# Patient Record
Sex: FEMALE | Race: WHITE | NOT HISPANIC OR LATINO | Employment: FULL TIME | URBAN - NONMETROPOLITAN AREA
[De-identification: names, ages, dates, MRNs, and addresses within clinical notes are randomized per-mention and may not be internally consistent; named-entity substitution may affect disease eponyms.]

---

## 2020-07-24 ENCOUNTER — OFFICE VISIT (OUTPATIENT)
Dept: PODIATRY | Facility: CLINIC | Age: 50
End: 2020-07-24

## 2020-07-24 VITALS — WEIGHT: 125 LBS | BODY MASS INDEX: 23 KG/M2 | HEIGHT: 62 IN | OXYGEN SATURATION: 98 % | HEART RATE: 83 BPM

## 2020-07-24 DIAGNOSIS — Q66.89 TARSAL COALITION OF LEFT FOOT: ICD-10-CM

## 2020-07-24 DIAGNOSIS — L84 FOOT CALLUS: ICD-10-CM

## 2020-07-24 DIAGNOSIS — M25.572 LEFT ANKLE PAIN, UNSPECIFIED CHRONICITY: ICD-10-CM

## 2020-07-24 DIAGNOSIS — M20.42 HAMMER TOE OF LEFT FOOT: Primary | ICD-10-CM

## 2020-07-24 DIAGNOSIS — M79.672 LEFT FOOT PAIN: ICD-10-CM

## 2020-07-24 PROCEDURE — 99203 OFFICE O/P NEW LOW 30 MIN: CPT | Performed by: PODIATRIST

## 2020-07-24 RX ORDER — ESTRADIOL 0.1 MG/G
CREAM VAGINAL
COMMUNITY
Start: 2019-11-07 | End: 2021-04-13

## 2020-07-24 RX ORDER — DEXTROAMPHETAMINE SACCHARATE, AMPHETAMINE ASPARTATE MONOHYDRATE, DEXTROAMPHETAMINE SULFATE AND AMPHETAMINE SULFATE 5; 5; 5; 5 MG/1; MG/1; MG/1; MG/1
20 CAPSULE, EXTENDED RELEASE ORAL
COMMUNITY
Start: 2020-07-28 | End: 2021-04-13

## 2020-07-24 NOTE — PROGRESS NOTES
Rita Cerda  1970  49 y.o. female     Patient presents to clinic today with complaint of a possible corn between her left fourth and fifth toes.    07/24/2020  Chief Complaint   Patient presents with   • Left Foot - Pain           History of Present Illness    Rita Cerda is a 49 y.o. female presents for evaluation of left fourth and fifth toe pain.  She states is a chronic, slowly worsening issue over the past year.  She states that her toes seem to rub together her shoe and she develops a painful skin lesion between her toes.  This is worse with prolonged weightbearing and close toed shoes and relatively relieved with rest.  She also feels that when standing in place she is unstable on her left foot and feels like her foot wants to roll inward whenever she shifts her body weight.  She denies any prior history of significant trauma or injury.  She denies any significant pain in her ankle.  She denies any other previous treatments for this issue.      Past Medical History:   Diagnosis Date   • Breast cancer (CMS/HCC)    • Callus          Past Surgical History:   Procedure Laterality Date   • BREAST BIOPSY     • BREAST LUMPECTOMY     • HYSTERECTOMY     • OOPHORECTOMY           Family History   Problem Relation Age of Onset   • Breast cancer Mother    • Cancer Mother    • Heart disease Mother    • Diabetes Mother    • Hypertension Mother    • Cancer Father    • Heart disease Father    • Hypertension Father          Social History     Socioeconomic History   • Marital status:      Spouse name: Not on file   • Number of children: Not on file   • Years of education: Not on file   • Highest education level: Not on file   Tobacco Use   • Smoking status: Never Smoker   • Smokeless tobacco: Never Used   Substance and Sexual Activity   • Alcohol use: Never     Frequency: Never   • Drug use: Never   • Sexual activity: Defer         Current Outpatient Medications   Medication Sig Dispense Refill   •  "estradiol (ESTRACE) 0.1 MG/GM vaginal cream PLACE 1 GRAM VAGINALLY DAILY AS NEEDED.     • estrogens, conjugated, (Premarin) 0.3 MG tablet TAKE 1 TABLET BY MOUTH EVERY DAY     • [START ON 7/28/2020] amphetamine-dextroamphetamine XR (ADDERALL XR) 20 MG 24 hr capsule Take 20 mg by mouth       No current facility-administered medications for this visit.          OBJECTIVE    Pulse 83   Ht 157.5 cm (62\")   Wt 56.7 kg (125 lb)   SpO2 98%   BMI 22.86 kg/m²       Review of Systems   Constitutional: Negative.    HENT: Negative.    Eyes: Negative.    Respiratory: Negative.    Cardiovascular: Negative.    Gastrointestinal: Negative.    Endocrine: Negative.         Graves   Genitourinary: Negative.    Musculoskeletal:        Foot pain   Skin: Negative.    Allergic/Immunologic: Negative.    Neurological: Negative.    Hematological: Negative.    Psychiatric/Behavioral:        ADHD         Physical Exam   Constitutional: she appears well-developed and well-nourished.   HEENT: Normocephalic. Atraumatic  CV: No CP. RRR  Resp: Non-labored respirations  Psychiatric: she has a normal mood and affect. her behavior is normal.         Lower Extremity Exam:  Vascular: DP/PT pulses palpable 2+.   No edema  Foot warm  CFT wnl  Neuro: Protective sensation intact, b/l.  DTRs intact  Integument: No open wounds   Interdigital corn formation to lateral left fourth digit.  Positive tenderness palpation.  No erythema, scaling  Skin quality normal  Musculoskeletal: LE muscle strength 5/5.   Gait normal  Ankle ROM full without pain or crepitus  STJ ROM limited on left without pain or crepitus  Flexible hammertoe deformity 2 through 5 with adductovarus rotation of digits 4 and 5 on left              ASSESSMENT AND PLAN    Rita was seen today for pain.    Diagnoses and all orders for this visit:    Hammer toe of left foot    Left foot pain  -     XR Foot 3+ View Left    Left ankle pain, unspecified chronicity  -     XR Ankle 3+ View Left    Tarsal " coalition of left foot  -     CT FOOT LEFT WITHOUT CONTRAST; Future    Foot callus      -Comprehensive foot and ankle exam  -Radiographs ordered and reviewed  -Discussed findings of flexible adductovarus hammertoe deformity as well as suspected middle facet tarsal coalition.  -Recommend CT scan to confirm diagnosis of tarsal coalition.  -Refer to physical therapy for custom orthotics with lateral heel wedge to increase stability  -Dispensed toe sleeves for padding of fourth and fifth digits.  Briefly discussed possible surgical correction in future as necessary.  It advised soft mesh wide toe shoe box.  -Recheck 4 weeks, as needed          This document has been electronically signed by Bakari Wilkerson DPM on July 27, 2020 15:39     EMR Dragon/Transcription disclaimer:   Much of this encounter note is an electronic transcription/translation of spoken language to printed text. The electronic translation of spoken language may permit erroneous, or at times, nonsensical words or phrases to be inadvertently transcribed; Although I have reviewed the note for such errors, some may still exist.    Bakari Wilkerson DPM  7/27/2020  15:39

## 2020-07-27 ENCOUNTER — TRANSCRIBE ORDERS (OUTPATIENT)
Dept: PODIATRY | Facility: CLINIC | Age: 50
End: 2020-07-27

## 2020-07-27 DIAGNOSIS — Q66.89 TARSAL COALITION: Primary | ICD-10-CM

## 2020-07-30 ENCOUNTER — HOSPITAL ENCOUNTER (OUTPATIENT)
Dept: CT IMAGING | Facility: HOSPITAL | Age: 50
Discharge: HOME | End: 2020-07-30
Admitting: PODIATRIST

## 2020-07-30 DIAGNOSIS — Q66.89 TARSAL COALITION OF LEFT FOOT: ICD-10-CM

## 2020-07-30 PROCEDURE — 73700 CT LOWER EXTREMITY W/O DYE: CPT

## 2020-08-06 ENCOUNTER — HOSPITAL ENCOUNTER (OUTPATIENT)
Dept: PHYSICAL THERAPY | Facility: HOSPITAL | Age: 50
Setting detail: THERAPIES SERIES
Discharge: HOME | End: 2020-08-06

## 2020-08-06 DIAGNOSIS — Q66.89 TARSAL COALITION: Primary | ICD-10-CM

## 2020-08-06 PROCEDURE — 97161 PT EVAL LOW COMPLEX 20 MIN: CPT | Performed by: PHYSICAL THERAPIST

## 2020-08-06 NOTE — THERAPY EVALUATION
Outpatient Physical Therapy Ortho Initial Evaluation  [facility]     Patient Name: Rita Cerda  : 1970  MRN: [id number]  Today's Date: 2020      Visit Date: 2020    There is no problem list on file for this patient.       Past Medical History:   Diagnosis Date   • Breast cancer (CMS/HCC)    • Callus         Past Surgical History:   Procedure Laterality Date   • BREAST BIOPSY     • BREAST LUMPECTOMY     • HYSTERECTOMY     • OOPHORECTOMY         Visit Dx:     ICD-10-CM ICD-9-CM   1. Tarsal coalition Q66.89 755.67             PT Ortho     Row Name 20 0850       Subjective Comments    Subjective Comments  Present today with no history of custom orthotics. Notes left foot pain more than right. Notes pain of lateral left foot and 4th and 5th ray.   -BB       Precautions and Contraindications    Precautions/Limitations  no known precautions/limitations  -BB       Subjective Pain    Able to rate subjective pain?  yes  -BB    Subjective Pain Comment  specific pain level not noted  -BB       Posture/Observations    Posture/Observations Comments  callusing between 4th and 5th toe on left. Bilateral supination L>R with pes cavus feet.   -BB       Special Tests/Palpation    Special Tests/Palpation  -- tight PF noted   -BB       General ROM    GENERAL ROM COMMENTS  WNL for gait   -BB       MMT (Manual Muscle Testing)    General MMT Comments  WNL for gait   -BB       Sensation    Sensation WNL?  WNL  -BB      User Key  (r) = Recorded By, (t) = Taken By, (c) = Cosigned By    Initials Name Provider Type    BB Lilly Calvo, PT DPT Physical Therapist                      Therapy Education  Education Details: skin inspection and wear schedule   Given: HEP  Program: New  How Provided: Verbal  Provided to: Patient  Level of Understanding: Verbalized     PT OP Goals     Row Name 20 0850          PT Short Term Goals    STG Date to Achieve  20  -BB     STG 1  Independent in orthotic wear  schedule and skin inspection   -BB        Time Calculation    PT Goal Re-Cert Due Date  08/27/20  -BB       User Key  (r) = Recorded By, (t) = Taken By, (c) = Cosigned By    Initials Name Provider Type    Lilly Sagastume PT DPT Physical Therapist          PT Assessment/Plan     Row Name 08/06/20 0850          PT Assessment    Functional Limitations  Impaired gait;Limitations in functional capacity and performance  -BB     Impairments  Balance;Gait;Pain;Poor body mechanics  -BB     Rehab Potential  Good  -BB     Patient/caregiver participated in establishment of treatment plan and goals  Yes  -BB     Patient would benefit from skilled therapy intervention  Yes  -BB        PT Plan    Predicted Duration of Therapy Intervention (Therapy Eval)  PRN  -BB     Planned CPT's?  PT EVAL LOW COMPLEXITY: 17492;PT ORTHOTIC MGMT/TRAIN EA 15 MIN: 31024;PT THER SUPP EA 15 MIN  -BB     PT Plan Comments  orthotic checkout and skin inspection   -BB       User Key  (r) = Recorded By, (t) = Taken By, (c) = Cosigned By    Initials Name Provider Type    Lilly Sagastume PT DPT Physical Therapist            OP Exercises     Row Name 08/06/20 0850             Subjective Comments    Subjective Comments  Present today with no history of custom orthotics. Notes left foot pain more than right. Notes pain of lateral left foot and 4th and 5th ray.   -BB         Subjective Pain    Able to rate subjective pain?  yes  -BB      Subjective Pain Comment  specific pain level not noted  -BB        User Key  (r) = Recorded By, (t) = Taken By, (c) = Cosigned By    Initials Name Provider Type    Lilly Sagastume PT DPT Physical Therapist                                  Time Calculation:     Start Time: 0850  Stop Time: 0910  Time Calculation (min): 20 min     Therapy Charges for Today     Code Description Service Date Service Provider Modifiers Qty    57809832685  PT EVAL LOW COMPLEXITY 1 8/6/2020 Lilly Calvo PT DPT GP 1    [id number]   PT-CUSTOM ORTHOTICS-LEVEL 2 8/6/2020 Lilly Calvo, PT DPT  1                    Lilly Calvo, PT DPT  8/6/2020

## 2020-08-13 ENCOUNTER — OFFICE VISIT (OUTPATIENT)
Dept: PODIATRY | Facility: CLINIC | Age: 50
End: 2020-08-13

## 2020-08-13 VITALS — WEIGHT: 125 LBS | OXYGEN SATURATION: 99 % | HEART RATE: 81 BPM | HEIGHT: 62 IN | BODY MASS INDEX: 23 KG/M2

## 2020-08-13 DIAGNOSIS — L84 FOOT CALLUS: ICD-10-CM

## 2020-08-13 DIAGNOSIS — Q66.72 PES CAVUS OF LEFT FOOT: ICD-10-CM

## 2020-08-13 DIAGNOSIS — M20.42 HAMMER TOE OF LEFT FOOT: Primary | ICD-10-CM

## 2020-08-13 PROCEDURE — 99213 OFFICE O/P EST LOW 20 MIN: CPT | Performed by: PODIATRIST

## 2020-08-13 NOTE — PROGRESS NOTES
Rita Cerda  1970  49 y.o. female     Patient presents today for CT results.     08/13/2020    Chief Complaint   Patient presents with   • Left Foot - CT results, Follow-up           History of Present Illness    Rita Cerda is a 49 y.o. female presents for f/u evaluation of left fourth and fifth toe pain, ankle instability and concern for possible underlying tarsal coalition.  She was sent for CT scan since last visit to rule out coalition.  She continues to have some pain due to interdigital corn formation.  States that toe sleeves and spacers help somewhat.  She has also been fitted for her custom orthotics but they have not yet been fabricated.    Past Medical History:   Diagnosis Date   • Breast cancer (CMS/HCC)    • Callus          Past Surgical History:   Procedure Laterality Date   • BREAST BIOPSY     • BREAST LUMPECTOMY     • HYSTERECTOMY     • OOPHORECTOMY           Family History   Problem Relation Age of Onset   • Breast cancer Mother    • Cancer Mother    • Heart disease Mother    • Diabetes Mother    • Hypertension Mother    • Cancer Father    • Heart disease Father    • Hypertension Father          Social History     Socioeconomic History   • Marital status:      Spouse name: Not on file   • Number of children: Not on file   • Years of education: Not on file   • Highest education level: Not on file   Tobacco Use   • Smoking status: Never Smoker   • Smokeless tobacco: Never Used   Substance and Sexual Activity   • Alcohol use: Never     Frequency: Never   • Drug use: Never   • Sexual activity: Defer         Current Outpatient Medications   Medication Sig Dispense Refill   • amphetamine-dextroamphetamine XR (ADDERALL XR) 20 MG 24 hr capsule Take 20 mg by mouth     • estradiol (ESTRACE) 0.1 MG/GM vaginal cream PLACE 1 GRAM VAGINALLY DAILY AS NEEDED.     • estrogens, conjugated, (Premarin) 0.3 MG tablet TAKE 1 TABLET BY MOUTH EVERY DAY       No current facility-administered  "medications for this visit.          OBJECTIVE    Pulse 81   Ht 157.5 cm (62\")   Wt 56.7 kg (125 lb)   SpO2 99%   BMI 22.86 kg/m²       Review of Systems   Constitutional: Negative.    HENT: Negative.    Eyes: Negative.    Respiratory: Negative.    Cardiovascular: Negative.    Gastrointestinal: Negative.    Endocrine: Negative.         Graves   Genitourinary: Negative.    Musculoskeletal:        Foot pain   Skin: Negative.    Allergic/Immunologic: Negative.    Neurological: Negative.    Hematological: Negative.    Psychiatric/Behavioral:        ADHD         Physical Exam   Constitutional: she appears well-developed and well-nourished.   HEENT: Normocephalic. Atraumatic  CV: No CP. RRR  Resp: Non-labored respirations  Psychiatric: she has a normal mood and affect. her behavior is normal.         Lower Extremity Exam:  Vascular: DP/PT pulses palpable 2+.   No edema  Foot warm  CFT wnl  Neuro: Protective sensation intact, b/l.  DTRs intact  Integument: No open wounds   Interdigital corn formation to lateral left fourth digit.  Positive tenderness palpation.  No erythema, scaling  Skin quality normal  Musculoskeletal: LE muscle strength 5/5.   Gait normal  Ankle ROM full without pain or crepitus  STJ ROM limited on left without pain or crepitus  Flexible hammertoe deformity 2 through 5 with adductovarus rotation of digits 4 and 5 on left      CT left foot.         CLINICAL INDICATION: Left foot pain. Evaluate for tarsal  coalition.        COMPARISON: Left foot July 24, 2020.        TECHNIQUE: Noncontrast study. Helical scanning with axial and  coronal reformations. Soft tissue, lung, and bone windows  reviewed.     This exam was performed according to our departmental  dose-optimization program, which includes automated exposure  control, adjustment of the mA and/or kV according to patient size  and/or use of iterative reconstruction technique.      CT FINDINGS: There are no bony abnormalities. No evidence of " a  tarsal coalition.     No soft tissue abnormalities are appreciated.     IMPRESSION:  Unremarkable CT left foot. No evidence for tarsal  coalition.     Electronically signed by:  Alessandro Hdez MD  7/31/2020 1:38 PM CDT  Workstation: [id number]        ASSESSMENT AND PLAN    Rita was seen today for ct results and follow-up.    Diagnoses and all orders for this visit:    Hammer toe of left foot    Foot callus    Pes cavus of left foot      -Comprehensive foot and ankle exam  -CT scan reviewed with patient.  No evidence of tarsal coalition noted, however patient does have a hindfoot cavus with limited subtalar joint eversion.  -We did discuss again findings of interdigital corn formation due to hammertoe and tailor's bunion.  Discussed continued appropriate shoe gear and digital spacer use.  Briefly discussed possible surgical correction of both hammertoe deformity and hindfoot cavus.  Will await custom orthotic fabrication and trial these for about a month before making any further decisions.  -Recheck 4 weeks, as needed          This document has been electronically signed by Bakari Wilkerson DPM on August 15, 2020 13:47     EMR Dragon/Transcription disclaimer:   Much of this encounter note is an electronic transcription/translation of spoken language to printed text. The electronic translation of spoken language may permit erroneous, or at times, nonsensical words or phrases to be inadvertently transcribed; Although I have reviewed the note for such errors, some may still exist.    Bakari Wilkerson DPM  8/15/2020  13:47

## 2021-01-11 ENCOUNTER — OFFICE VISIT (OUTPATIENT)
Dept: PODIATRY | Facility: CLINIC | Age: 51
End: 2021-01-11

## 2021-01-11 VITALS — OXYGEN SATURATION: 98 % | BODY MASS INDEX: 23 KG/M2 | WEIGHT: 125 LBS | HEART RATE: 78 BPM | HEIGHT: 62 IN

## 2021-01-11 DIAGNOSIS — M25.372 ANKLE INSTABILITY, LEFT: ICD-10-CM

## 2021-01-11 DIAGNOSIS — L84 FOOT CALLUS: ICD-10-CM

## 2021-01-11 DIAGNOSIS — M20.42 HAMMER TOE OF LEFT FOOT: ICD-10-CM

## 2021-01-11 DIAGNOSIS — Q66.72 PES CAVUS OF LEFT FOOT: Primary | ICD-10-CM

## 2021-01-11 PROCEDURE — 99213 OFFICE O/P EST LOW 20 MIN: CPT | Performed by: PODIATRIST

## 2021-01-11 NOTE — PROGRESS NOTES
Rita Cerda  1970  50 y.o. female     Patient presents today for a follow up on the left foot.     01/11/2021      Chief Complaint   Patient presents with   • Left Foot - Follow-up           History of Present Illness    Rita Cerda is a 50 y.o. female presents for f/u evaluation of left fourth and fifth toe pain, ankle instability.  Previously treated with custom orthotics which she states initially seemed to help somewhat with her instability however she feels this has been less helpful lately.  She continues to have recurrent pain to her left fourth and fifth toes as well due to an interdigital callus formation which she has been trimming with some improvement.  She states overall her symptoms are improved compared to 6 months ago however still present.    Past Medical History:   Diagnosis Date   • Breast cancer (CMS/HCC)    • Callus          Past Surgical History:   Procedure Laterality Date   • BREAST BIOPSY     • BREAST LUMPECTOMY     • HYSTERECTOMY     • OOPHORECTOMY           Family History   Problem Relation Age of Onset   • Breast cancer Mother    • Cancer Mother    • Heart disease Mother    • Diabetes Mother    • Hypertension Mother    • Cancer Father    • Heart disease Father    • Hypertension Father          Social History     Socioeconomic History   • Marital status:      Spouse name: Not on file   • Number of children: Not on file   • Years of education: Not on file   • Highest education level: Not on file   Tobacco Use   • Smoking status: Never Smoker   • Smokeless tobacco: Never Used   Substance and Sexual Activity   • Alcohol use: Never     Frequency: Never   • Drug use: Never   • Sexual activity: Defer         Current Outpatient Medications   Medication Sig Dispense Refill   • estradiol (ESTRACE) 0.1 MG/GM vaginal cream PLACE 1 GRAM VAGINALLY DAILY AS NEEDED.     • estrogens, conjugated, (Premarin) 0.3 MG tablet TAKE 1 TABLET BY MOUTH EVERY DAY     •  "amphetamine-dextroamphetamine XR (ADDERALL XR) 20 MG 24 hr capsule Take 20 mg by mouth       No current facility-administered medications for this visit.          OBJECTIVE    Pulse 78   Ht 157.5 cm (62\")   Wt 56.7 kg (125 lb)   SpO2 98%   BMI 22.86 kg/m²       Review of Systems   Constitutional: Negative.    HENT: Negative.    Eyes: Negative.    Respiratory: Negative.    Cardiovascular: Negative.    Gastrointestinal: Negative.    Endocrine: Negative.         Graves   Genitourinary: Negative.    Musculoskeletal:        Foot pain   Skin: Negative.    Allergic/Immunologic: Negative.    Neurological: Negative.    Hematological: Negative.    Psychiatric/Behavioral:        ADHD         Physical Exam   Constitutional: she appears well-developed and well-nourished.   HEENT: Normocephalic. Atraumatic  CV: No CP. RRR  Resp: Non-labored respirations  Psychiatric: she has a normal mood and affect. her behavior is normal.         Lower Extremity Exam:  Vascular: DP/PT pulses palpable 2+.   No edema  Foot warm  CFT wnl  Neuro: Protective sensation intact, b/l.  DTRs intact  Integument: No open wounds   Interdigital corn formation to lateral left fourth digit.  Positive tenderness palpation.  No erythema, scaling  Skin quality normal  Musculoskeletal: LE muscle strength 5/5.   Gait normal  Ankle ROM full without pain or crepitus  STJ ROM limited on left without pain or crepitus  Flexible hammertoe deformity 2 through 5 with adductovarus rotation of digits 4 and 5 on left      CT left foot.         CLINICAL INDICATION: Left foot pain. Evaluate for tarsal  coalition.        COMPARISON: Left foot July 24, 2020.        TECHNIQUE: Noncontrast study. Helical scanning with axial and  coronal reformations. Soft tissue, lung, and bone windows  reviewed.     This exam was performed according to our departmental  dose-optimization program, which includes automated exposure  control, adjustment of the mA and/or kV according to patient " size  and/or use of iterative reconstruction technique.      CT FINDINGS: There are no bony abnormalities. No evidence of a  tarsal coalition.     No soft tissue abnormalities are appreciated.     IMPRESSION:  Unremarkable CT left foot. No evidence for tarsal  coalition.     Electronically signed by:  Alessandro Hdez MD  7/31/2020 1:38 PM CDT  Workstation: [id number]        ASSESSMENT AND PLAN    Diagnoses and all orders for this visit:    1. Pes cavus of left foot (Primary)    2. Ankle instability, left    3. Hammer toe of left foot    4. Foot callus      -Comprehensive foot and ankle exam  -Extensive discussion regarding hammertoe deformity as well as mild ankle instability resultant from pes cavus.  Her deformities however are relatively mild.  At this time I did not recommend surgical intervention as they do not seem to be limiting her daily function.  Did perform a modification of her custom orthotic with addition of a heel lift and medial heel skive.    - continue toe spacers and padding, callus management  -Recheck 1 month, as needed          This document has been electronically signed by Bakari Wilkerson DPM on January 12, 2021 21:20 CST     EMR Dragon/Transcription disclaimer:   Much of this encounter note is an electronic transcription/translation of spoken language to printed text. The electronic translation of spoken language may permit erroneous, or at times, nonsensical words or phrases to be inadvertently transcribed; Although I have reviewed the note for such errors, some may still exist.    Bakari Wilkerson DPM  1/12/2021  21:20 CST

## 2021-03-25 ENCOUNTER — OFFICE VISIT (OUTPATIENT)
Dept: PODIATRY | Facility: CLINIC | Age: 51
End: 2021-03-25

## 2021-03-25 VITALS — HEART RATE: 91 BPM | BODY MASS INDEX: 23 KG/M2 | WEIGHT: 125 LBS | OXYGEN SATURATION: 98 % | HEIGHT: 62 IN

## 2021-03-25 DIAGNOSIS — L84 FOOT CALLUS: ICD-10-CM

## 2021-03-25 DIAGNOSIS — M21.622 TAILOR'S BUNION OF LEFT FOOT: ICD-10-CM

## 2021-03-25 DIAGNOSIS — M20.42 HAMMER TOE OF LEFT FOOT: Primary | ICD-10-CM

## 2021-03-25 PROCEDURE — 99214 OFFICE O/P EST MOD 30 MIN: CPT | Performed by: PODIATRIST

## 2021-03-25 NOTE — PROGRESS NOTES
Rita Cerda  1970  50 y.o. female     Patient presents today for a follow up on the left foot.     03/25/2021        Chief Complaint   Patient presents with   • Left Foot - Follow-up           History of Present Illness    Rita Cerda is a 50 y.o. female presents for f/u evaluation of left fourth and fifth toe pain, ankle instability.  Previously treated with custom orthotics which she states initially seemed to help somewhat with her instability.  She continues to have recurrent pain to her left fourth and fifth toes as well due to an interdigital callus formation which she has been trimming with some improvement.  She states overall her symptoms are improved compared to 6 months ago however still present.  She wishes to discuss more long-term options.    Past Medical History:   Diagnosis Date   • Breast cancer (CMS/HCC)    • Callus          Past Surgical History:   Procedure Laterality Date   • BREAST BIOPSY     • BREAST LUMPECTOMY     • HYSTERECTOMY     • OOPHORECTOMY           Family History   Problem Relation Age of Onset   • Breast cancer Mother    • Cancer Mother    • Heart disease Mother    • Diabetes Mother    • Hypertension Mother    • Cancer Father    • Heart disease Father    • Hypertension Father          Social History     Socioeconomic History   • Marital status:      Spouse name: Not on file   • Number of children: Not on file   • Years of education: Not on file   • Highest education level: Not on file   Tobacco Use   • Smoking status: Never Smoker   • Smokeless tobacco: Never Used   Substance and Sexual Activity   • Alcohol use: Never   • Drug use: Never   • Sexual activity: Defer         Current Outpatient Medications   Medication Sig Dispense Refill   • amphetamine-dextroamphetamine XR (ADDERALL XR) 20 MG 24 hr capsule Take 20 mg by mouth     • estradiol (ESTRACE) 0.1 MG/GM vaginal cream PLACE 1 GRAM VAGINALLY DAILY AS NEEDED.     • estrogens, conjugated, (Premarin) 0.3 MG  "tablet TAKE 1 TABLET BY MOUTH EVERY DAY       No current facility-administered medications for this visit.         OBJECTIVE    Pulse 91   Ht 157.5 cm (62\")   Wt 56.7 kg (125 lb)   SpO2 98%   BMI 22.86 kg/m²       Review of Systems   Constitutional: Negative.    HENT: Negative.    Eyes: Negative.    Respiratory: Negative.    Cardiovascular: Negative.    Gastrointestinal: Negative.    Endocrine: Negative.         Graves   Genitourinary: Negative.    Musculoskeletal:        Foot pain   Skin: Negative.    Allergic/Immunologic: Negative.    Neurological: Negative.    Hematological: Negative.    Psychiatric/Behavioral:        ADHD         Physical Exam   Constitutional: she appears well-developed and well-nourished.   HEENT: Normocephalic. Atraumatic  CV: No CP. RRR  Resp: Non-labored respirations  Psychiatric: she has a normal mood and affect. her behavior is normal.         Lower Extremity Exam:  Vascular: DP/PT pulses palpable 2+.   No edema  Foot warm  CFT wnl  Neuro: Protective sensation intact, b/l.  DTRs intact  Integument: No open wounds   Interdigital corn formation to lateral left fourth digit.  Positive tenderness palpation.  No erythema, scaling  Skin quality normal  Musculoskeletal: LE muscle strength 5/5.   Gait normal  Ankle ROM full without pain or crepitus  STJ ROM limited on left without pain or crepitus  Flexible hammertoe deformity 2 through 5 with adductovarus rotation of digits 4 and 5 on left  Mild tailor's bunion on left          ASSESSMENT AND PLAN    Diagnoses and all orders for this visit:    1. Hammer toe of left foot (Primary)  -     Case Request  -     ceFAZolin (ANCEF) 2 g in sodium chloride 0.9 % 100 mL IVPB    2. Foot callus  -     Case Request  -     ceFAZolin (ANCEF) 2 g in sodium chloride 0.9 % 100 mL IVPB    3. Tailor's bunion of left foot  -     Case Request  -     ceFAZolin (ANCEF) 2 g in sodium chloride 0.9 % 100 mL IVPB    Other orders  -     Follow Anesthesia Guidelines / " Protocol; Future  -     Obtain Informed Consent; Future  -     Follow Anesthesia Guidelines / Protocol; Standing  -     Provide Instructions to Patient Regarding NPO Status; Future  -     Verify NPO Status; Standing  -     Obtain Informed Consent (If Not Done Inpatient or PAT); Standing      -Comprehensive foot and ankle exam  -Extensive discussion regarding hammertoe deformity as well as mild ankle instability resultant from pes cavus.    -Orthotics have helped with her instability however she continues to have significant forefoot pain related to painful interdigital skin lesion, hammertoe and tailor's bunion deformities.  She is interested in surgical correction.  We discussed all risk, benefits and potential complications related to fourth and fifth hammertoe correction, tailor's bunionectomy and excision of skin lesion.  -We will plan for 4/16/2020  -Recheck 1 month          This document has been electronically signed by Bakari Wilkerson DPM on March 29, 2021 07:31 CDT     EMR Dragon/Transcription disclaimer:   Much of this encounter note is an electronic transcription/translation of spoken language to printed text. The electronic translation of spoken language may permit erroneous, or at times, nonsensical words or phrases to be inadvertently transcribed; Although I have reviewed the note for such errors, some may still exist.    Bakari Wilkerson DPM  3/29/2021  07:31 CDT

## 2021-03-29 PROBLEM — M20.42 HAMMER TOE OF LEFT FOOT: Status: ACTIVE | Noted: 2021-03-29

## 2021-03-29 PROBLEM — M21.622 TAILOR'S BUNION OF LEFT FOOT: Status: ACTIVE | Noted: 2021-03-29

## 2021-03-29 PROBLEM — L84 FOOT CALLUS: Status: ACTIVE | Noted: 2021-03-29

## 2021-03-29 RX ORDER — BUPIVACAINE HCL/0.9 % NACL/PF 0.1 %
2 PLASTIC BAG, INJECTION (ML) EPIDURAL ONCE
Status: CANCELLED | OUTPATIENT
Start: 2021-04-16 | End: 2021-03-29

## 2021-04-07 ENCOUNTER — TELEPHONE (OUTPATIENT)
Dept: PODIATRY | Facility: CLINIC | Age: 51
End: 2021-04-07

## 2021-04-07 NOTE — TELEPHONE ENCOUNTER
Spoke with patient and let her know that I have received the [facility] paperwork but Bryan has not sent me anything.

## 2021-04-07 NOTE — TELEPHONE ENCOUNTER
PT called stating a [place] paper was faxed over for her and she wanted to make sure it was received and from [facility] term [facility].

## 2021-04-13 ENCOUNTER — LAB (OUTPATIENT)
Dept: LAB | Facility: HOSPITAL | Age: 51
End: 2021-04-13

## 2021-04-13 ENCOUNTER — PRE-ADMISSION TESTING (OUTPATIENT)
Dept: PREADMISSION TESTING | Facility: HOSPITAL | Age: 51
End: 2021-04-13

## 2021-04-13 VITALS
DIASTOLIC BLOOD PRESSURE: 80 MMHG | WEIGHT: 133 LBS | SYSTOLIC BLOOD PRESSURE: 126 MMHG | HEART RATE: 66 BPM | RESPIRATION RATE: 18 BRPM | OXYGEN SATURATION: 97 % | HEIGHT: 62 IN | BODY MASS INDEX: 24.48 KG/M2

## 2021-04-13 DIAGNOSIS — Z01.818 PREOP TESTING: Primary | ICD-10-CM

## 2021-04-13 LAB — SARS-COV-2 N GENE RESP QL NAA+PROBE: NOT DETECTED

## 2021-04-13 PROCEDURE — 87635 SARS-COV-2 COVID-19 AMP PRB: CPT

## 2021-04-13 PROCEDURE — C9803 HOPD COVID-19 SPEC COLLECT: HCPCS

## 2021-04-13 RX ORDER — SODIUM CHLORIDE, SODIUM GLUCONATE, SODIUM ACETATE, POTASSIUM CHLORIDE AND MAGNESIUM CHLORIDE 526; 502; 368; 37; 30 MG/100ML; MG/100ML; MG/100ML; MG/100ML; MG/100ML
1000 INJECTION, SOLUTION INTRAVENOUS CONTINUOUS PRN
Status: CANCELLED | OUTPATIENT
Start: 2021-04-16

## 2021-04-16 ENCOUNTER — ANESTHESIA EVENT (OUTPATIENT)
Dept: PERIOP | Facility: HOSPITAL | Age: 51
End: 2021-04-16

## 2021-04-16 ENCOUNTER — HOSPITAL ENCOUNTER (OUTPATIENT)
Facility: HOSPITAL | Age: 51
Setting detail: HOSPITAL OUTPATIENT SURGERY
Discharge: HOME | End: 2021-04-16
Attending: PODIATRIST | Admitting: PODIATRIST

## 2021-04-16 ENCOUNTER — APPOINTMENT (OUTPATIENT)
Dept: GENERAL RADIOLOGY | Facility: HOSPITAL | Age: 51
End: 2021-04-16

## 2021-04-16 ENCOUNTER — ANESTHESIA (OUTPATIENT)
Dept: PERIOP | Facility: HOSPITAL | Age: 51
End: 2021-04-16

## 2021-04-16 VITALS
HEART RATE: 78 BPM | SYSTOLIC BLOOD PRESSURE: 139 MMHG | RESPIRATION RATE: 18 BRPM | BODY MASS INDEX: 24.1 KG/M2 | WEIGHT: 130.95 LBS | HEIGHT: 62 IN | TEMPERATURE: 97 F | OXYGEN SATURATION: 100 % | DIASTOLIC BLOOD PRESSURE: 87 MMHG

## 2021-04-16 DIAGNOSIS — M20.42 HAMMER TOE OF LEFT FOOT: ICD-10-CM

## 2021-04-16 DIAGNOSIS — L84 FOOT CALLUS: ICD-10-CM

## 2021-04-16 DIAGNOSIS — M21.622 TAILOR'S BUNION OF LEFT FOOT: ICD-10-CM

## 2021-04-16 PROCEDURE — 25010000002 MIDAZOLAM PER 1 MG: Performed by: NURSE ANESTHETIST, CERTIFIED REGISTERED

## 2021-04-16 PROCEDURE — 25010000002 FENTANYL CITRATE (PF) 100 MCG/2ML SOLUTION: Performed by: NURSE ANESTHETIST, CERTIFIED REGISTERED

## 2021-04-16 PROCEDURE — 76000 FLUOROSCOPY <1 HR PHYS/QHP: CPT

## 2021-04-16 PROCEDURE — 28308 INCISION OF METATARSAL: CPT | Performed by: PODIATRIST

## 2021-04-16 PROCEDURE — 25010000002 DEXAMETHASONE PER 1 MG: Performed by: NURSE ANESTHETIST, CERTIFIED REGISTERED

## 2021-04-16 PROCEDURE — 28230 INCISION OF FOOT TENDON(S): CPT | Performed by: PODIATRIST

## 2021-04-16 PROCEDURE — C1713 ANCHOR/SCREW BN/BN,TIS/BN: HCPCS | Performed by: PODIATRIST

## 2021-04-16 PROCEDURE — 25010000002 PROPOFOL 10 MG/ML EMULSION: Performed by: NURSE ANESTHETIST, CERTIFIED REGISTERED

## 2021-04-16 PROCEDURE — 11400 EXC TR-EXT B9+MARG 0.5 CM<: CPT | Performed by: PODIATRIST

## 2021-04-16 PROCEDURE — 25010000002 ONDANSETRON PER 1 MG: Performed by: NURSE ANESTHETIST, CERTIFIED REGISTERED

## 2021-04-16 PROCEDURE — 25010000002 CEFAZOLIN PER 500 MG: Performed by: PODIATRIST

## 2021-04-16 PROCEDURE — 76942 ECHO GUIDE FOR BIOPSY: CPT | Performed by: PODIATRIST

## 2021-04-16 PROCEDURE — 25010000002 ROPIVACAINE PER 1 MG: Performed by: ANESTHESIOLOGY

## 2021-04-16 PROCEDURE — 28285 REPAIR OF HAMMERTOE: CPT | Performed by: PODIATRIST

## 2021-04-16 DEVICE — CANNULATED SCREW
Type: IMPLANTABLE DEVICE | Site: TOE FIFTH | Status: FUNCTIONAL
Brand: ASNIS

## 2021-04-16 RX ORDER — SODIUM CHLORIDE, SODIUM GLUCONATE, SODIUM ACETATE, POTASSIUM CHLORIDE AND MAGNESIUM CHLORIDE 526; 502; 368; 37; 30 MG/100ML; MG/100ML; MG/100ML; MG/100ML; MG/100ML
1000 INJECTION, SOLUTION INTRAVENOUS CONTINUOUS PRN
Status: DISCONTINUED | OUTPATIENT
Start: 2021-04-16 | End: 2021-04-16 | Stop reason: HOSPADM

## 2021-04-16 RX ORDER — ONDANSETRON 2 MG/ML
INJECTION INTRAMUSCULAR; INTRAVENOUS AS NEEDED
Status: DISCONTINUED | OUTPATIENT
Start: 2021-04-16 | End: 2021-04-16 | Stop reason: SURG

## 2021-04-16 RX ORDER — BUPIVACAINE HCL/0.9 % NACL/PF 0.1 %
2 PLASTIC BAG, INJECTION (ML) EPIDURAL ONCE
Status: COMPLETED | OUTPATIENT
Start: 2021-04-16 | End: 2021-04-16

## 2021-04-16 RX ORDER — ONDANSETRON 2 MG/ML
4 INJECTION INTRAMUSCULAR; INTRAVENOUS ONCE AS NEEDED
Status: DISCONTINUED | OUTPATIENT
Start: 2021-04-16 | End: 2021-04-16 | Stop reason: HOSPADM

## 2021-04-16 RX ORDER — LIDOCAINE HYDROCHLORIDE 20 MG/ML
INJECTION, SOLUTION INFILTRATION; PERINEURAL AS NEEDED
Status: DISCONTINUED | OUTPATIENT
Start: 2021-04-16 | End: 2021-04-16 | Stop reason: SURG

## 2021-04-16 RX ORDER — PROPOFOL 10 MG/ML
VIAL (ML) INTRAVENOUS AS NEEDED
Status: DISCONTINUED | OUTPATIENT
Start: 2021-04-16 | End: 2021-04-16 | Stop reason: SURG

## 2021-04-16 RX ORDER — ACETAMINOPHEN 325 MG/1
650 TABLET ORAL ONCE
Status: COMPLETED | OUTPATIENT
Start: 2021-04-16 | End: 2021-04-16

## 2021-04-16 RX ORDER — HYDROCODONE BITARTRATE AND ACETAMINOPHEN 7.5; 325 MG/1; MG/1
1 TABLET ORAL EVERY 4 HOURS PRN
Qty: 30 TABLET | Refills: 0 | Status: SHIPPED | OUTPATIENT
Start: 2021-04-16 | End: 2021-05-10 | Stop reason: ALTCHOICE

## 2021-04-16 RX ORDER — FENTANYL CITRATE 50 UG/ML
INJECTION, SOLUTION INTRAMUSCULAR; INTRAVENOUS AS NEEDED
Status: DISCONTINUED | OUTPATIENT
Start: 2021-04-16 | End: 2021-04-16 | Stop reason: SURG

## 2021-04-16 RX ORDER — MIDAZOLAM HYDROCHLORIDE 1 MG/ML
INJECTION INTRAMUSCULAR; INTRAVENOUS AS NEEDED
Status: DISCONTINUED | OUTPATIENT
Start: 2021-04-16 | End: 2021-04-16 | Stop reason: SURG

## 2021-04-16 RX ORDER — DEXAMETHASONE SODIUM PHOSPHATE 4 MG/ML
INJECTION, SOLUTION INTRA-ARTICULAR; INTRALESIONAL; INTRAMUSCULAR; INTRAVENOUS; SOFT TISSUE AS NEEDED
Status: DISCONTINUED | OUTPATIENT
Start: 2021-04-16 | End: 2021-04-16 | Stop reason: SURG

## 2021-04-16 RX ORDER — ROPIVACAINE HYDROCHLORIDE 5 MG/ML
INJECTION, SOLUTION EPIDURAL; INFILTRATION; PERINEURAL
Status: COMPLETED | OUTPATIENT
Start: 2021-04-16 | End: 2021-04-16

## 2021-04-16 RX ADMIN — SODIUM CHLORIDE, SODIUM GLUCONATE, SODIUM ACETATE, POTASSIUM CHLORIDE AND MAGNESIUM CHLORIDE 1000 ML: 526; 502; 368; 37; 30 INJECTION, SOLUTION INTRAVENOUS at 06:31

## 2021-04-16 RX ADMIN — ONDANSETRON 4 MG: 2 INJECTION INTRAMUSCULAR; INTRAVENOUS at 08:53

## 2021-04-16 RX ADMIN — PROPOFOL 150 MG: 10 INJECTION, EMULSION INTRAVENOUS at 07:56

## 2021-04-16 RX ADMIN — ACETAMINOPHEN 650 MG: 325 TABLET ORAL at 10:04

## 2021-04-16 RX ADMIN — PROPOFOL 50 MG: 10 INJECTION, EMULSION INTRAVENOUS at 08:01

## 2021-04-16 RX ADMIN — ROPIVACAINE HYDROCHLORIDE 30 ML: 5 INJECTION, SOLUTION EPIDURAL; INFILTRATION; PERINEURAL at 07:11

## 2021-04-16 RX ADMIN — Medication 2 G: at 07:59

## 2021-04-16 RX ADMIN — FENTANYL CITRATE 50 MCG: 50 INJECTION INTRAMUSCULAR; INTRAVENOUS at 08:10

## 2021-04-16 RX ADMIN — DEXAMETHASONE SODIUM PHOSPHATE 4 MG: 4 INJECTION, SOLUTION INTRAMUSCULAR; INTRAVENOUS at 08:24

## 2021-04-16 RX ADMIN — LIDOCAINE HYDROCHLORIDE 60 MG: 20 INJECTION, SOLUTION INFILTRATION; PERINEURAL at 07:56

## 2021-04-16 RX ADMIN — FENTANYL CITRATE 50 MCG: 50 INJECTION INTRAMUSCULAR; INTRAVENOUS at 08:01

## 2021-04-16 RX ADMIN — MIDAZOLAM HYDROCHLORIDE 2 MG: 2 INJECTION, SOLUTION INTRAMUSCULAR; INTRAVENOUS at 07:48

## 2021-04-16 NOTE — BRIEF OP NOTE
HAMMER TOE REPAIR  Progress Note    Rita Cerda  4/16/2021    Pre-op Diagnosis:   Hammer toe of left foot [M20.42]  Foot callus [L84]  Tailor's bunion of left foot [M21.622]       Post-Op Diagnosis Codes:     * Hammer toe of left foot [M20.42]     * Foot callus [L84]     * Tailor's bunion of left foot [M21.622]    Procedure/CPT® Codes:        Procedure(s):  Left fourth toe flexor tenotomy and fifth hammertoe repair, tailors bunionectomy, excision skin lesion    Surgeon(s):  Bakari Wilkerson DPM    Anesthesia: Choice    Staff:   Circulator: Katelin Sun RN  Scrub Person: Samantha Barrientos  Assistant: Breanna Munguia MA  Assistant: Breanna Munguia MA      Estimated Blood Loss: minimal    Urine Voided: * No values recorded between 4/16/2021  7:51 AM and 4/16/2021  9:05 AM *    Specimens:                None          Drains: * No LDAs found *    Findings: Consistent with diagnosis    Complications: None    Assistant: Breanna Munguia MA  was responsible for performing the following activities: Retraction and Suction and their skilled assistance was necessary for the success of this case.            This document has been electronically signed by Bakari Wilkerson DPM on April 16, 2021 09:17 CDT     Bakari Wilkerson DPM     Date: 4/16/2021  Time: 09:16 CDT

## 2021-04-16 NOTE — INTERVAL H&P NOTE
Allergies   Allergen Reactions    Amoxicillin Palpitations     Vitals:    04/16/21 0712   BP: 128/76   Pulse:    Resp: 18   Temp: 97.6 °F (36.4 °C)   SpO2: 98%       H&P reviewed. The patient was examined and there are no changes to the H&P.   Proceed as planned.          This document has been electronically signed by Bakari Wilkerson DPM on April 16, 2021 07:25 CDT

## 2021-04-16 NOTE — ANESTHESIA POSTPROCEDURE EVALUATION
Patient: Rita Cerda    Procedure Summary     Date: 04/16/21 Room / Location: [facility] OR  / [facility] OR    Anesthesia Start: 0753 Anesthesia Stop: 0909    Procedure: Left fourth and fifth hammertoe repair, tailors bunionectomy, excision skin lesion (Left Toes) Diagnosis:       Hammer toe of left foot      Foot callus      Tailor's bunion of left foot      (Hammer toe of left foot [M20.42])      (Foot callus [L84])      (Tailor's bunion of left foot [M21.622])    Surgeons: Bakari Wilkerson DPM Provider: Taye Lopez MD    Anesthesia Type: general with block ASA Status: 3          Anesthesia Type: general with block    Vitals  No vitals data found for the desired time range.          Post Anesthesia Care and Evaluation    Patient location during evaluation: PACU  Patient participation: waiting for patient participation  Pain management: adequate  Airway patency: patent  Anesthetic complications: No anesthetic complications  PONV Status: none  Cardiovascular status: hemodynamically stable  Respiratory status: room air and spontaneous ventilation (LMA in place)  Hydration status: acceptable

## 2021-04-16 NOTE — DISCHARGE INSTRUCTIONS
Leave dressing clean, dry and intact until your first postoperative visit.    You may heel weight bear as tolerated in your surgical boot only. Use crutches as needed for assistance     Take pain medications as prescribed.     Elevate surgical extremity above level of heart while at rest. Apply ice back behind knee for 10 minutes of every hour while at rest.     Contact doctor for increased pain, drainage, nausea, vomiting, fever or chills.

## 2021-04-16 NOTE — ANESTHESIA PROCEDURE NOTES
Airway  Urgency: elective    Date/Time: 4/16/2021 7:57 AM  End Time:4/16/2021 7:57 AM  Airway not difficult    General Information and Staff    Patient location during procedure: OR  CRNA: Cesia Isidro CRNA    Indications and Patient Condition  Indications for airway management: airway protection    Preoxygenated: yes  Mask difficulty assessment: 1 - vent by mask    Final Airway Details  Final airway type: supraglottic airway      Successful airway: I-gel  Size 3    Number of attempts at approach: 1  Assessment: lips, teeth, and gum same as pre-op

## 2021-04-16 NOTE — ANESTHESIA PREPROCEDURE EVALUATION
Anesthesia Evaluation     no history of anesthetic complications:  NPO Solid Status: > 8 hours  NPO Liquid Status: > 8 hours           Airway   Mallampati: III  TM distance: >3 FB  Neck ROM: full  Possible difficult intubation and Small opening  Dental - normal exam         Pulmonary - normal exam    breath sounds clear to auscultation  (-) COPD, asthma, sleep apnea, not a smoker    ROS comment: cough  Cardiovascular   Exercise tolerance: good (4-7 METS)    Rhythm: regular  Rate: normal    (+) hyperlipidemia,   (-) hypertension, valvular problems/murmurs, dysrhythmias, angina, murmur, cardiac stents, DVT      Neuro/Psych  (-) seizures, TIA, CVA, headaches, weakness, numbness, psychiatric history  GI/Hepatic/Renal/Endo    (+)   renal disease (hx of stones) stones, thyroid problem (Graves)   (-) GERD, hepatitis, liver disease, diabetes    Musculoskeletal         ROS comment: Hammer toes  Abdominal    Substance History   (-) alcohol use, drug use     OB/GYN    (-)  Pregnant        Other      history of cancer (breast)    ROS/Med Hx Other: Spironolactone for hair loss and Graves Dz                Anesthesia Plan    ASA 3     general with block   (Popliteal nerve block discussed and patient agrees to proceed)  intravenous induction     Anesthetic plan, all risks, benefits, and alternatives have been provided, discussed and informed consent has been obtained with: patient and spouse/significant other.

## 2021-04-16 NOTE — OP NOTE
SURGEON: Bakari Wilkerson DPM    ASSISTANT: Breanna Munguia, CST     PREOPERATIVE DIAGNOSES:   1. Tailor’s bunion left foot.  2. Hammertoe deformity toes 4 and 5 left foot.   3. Painful benign skin lesion left 4th toe.     PROCEDURES PERFORMED:   1. Tailor’s bunionectomy with reverse Bassam osteotomy.   2. Fifth hammertoe correction.   3. Flexor tenotomy left 4th toe.  4. Excision benign lesion.     ANESTHESIA: General.     HEMOSTASIS: Left ankle pneumatic tourniquet per nursing records.     ESTIMATED BLOOD LOSS: Less than 10 mL.    MATERIALS: [place] 2.0 mm Micro Asnis screw x1.     INJECTABLES: Popliteal block.     SPECIMEN: None.     COMPLICATIONS: None.     INDICATIONS:  This is a private patient seen on an outpatient basis for chronic left foot pain due the above-mentioned digital deformities. She has previously failed conservative care and presents today for surgical correction. All risks, benefits and potential complications were described in detail. No guarantee was given or implied at any time. She had been n.p.o. since midnight. Informed consent had been obtained and located in the chart.     DESCRIPTION OF PROCEDURE:  Under mild sedation, the patient was brought in the operating room and placed on the operating table in supine position. Following induction of general anesthesia, the left foot and ankle were prepped and draped in the usual aseptic fashion. Attention was then drawn to the left 5th digit where 2 converging semielliptical incisions were created obliquely and a small skin wedge was excised. Dissection was carried down to the long extensor tendon. A transverse tenotomy was performed at the level of the proximal interphalangeal joint exposing the head of the proximal phalanx which was then resected utilizing a small bone saw and passed off the surgical field. The incision was then irrigated. The long extensor tendon was repaired utilizing 4-0 Vicryl. Next, attention was then drawn to the 5th  metatarsophalangeal joint where an approximately 3 cm linear longitudinal incision was created. Blunt dissection was carried down through the subcutaneous layer to the capsular structures. An inverted L capsulotomy was then performed exposing the head of the 5th metatarsal. A small bone saw was then utilized to create a V-shaped osteotomy with the apex oriented distally and the capital fragment was translated medially and impacted onto the distal 5th metatarsal. Temporary fixation was achieved with a smooth wire from the [place] Micro Asnis set. Intraoperative fluoroscopy confirmed appropriate reduction and placement of temporary fixation. Permanent fixation was achieved with a single 2.0 mm cannulated screw. Temporary fixation was then removed. A small bone saw was utilized to resect the overhanging lateral shelf of bone. The incision was irrigated with copious amounts of sterile saline and closed in a layered fashion. Finally, attention was drawn to the lateral aspect of the 4th toe where there was noted to be a thickened hyperkeratotic lesion at the level of the proximal interphalangeal joint. Two converging semielliptical incisions were created around this approximately 0.5 cm lesion and this wedge of skin was excised full-thickness. Following excision of the skin lesion, dissection was carried to the flexor tendon which was tenotomized through the same incision to decrease the adductovarus flexion contracture of the 4th toe. The incision was then irrigated and all incisions were closed in a layered fashion. The pneumatic tourniquet was deflated and an immediate hyperemic response was noted in digits 1 through 5 on the left foot. A dry sterile dressing was applied. The patient will be able to heel weight bear as tolerated in a CAM boot. She will follow up early next week for an incision check.

## 2021-04-16 NOTE — ANESTHESIA PROCEDURE NOTES
Peripheral Block      Patient reassessed immediately prior to procedure    Patient location during procedure: holding area  Start time: 4/16/2021 7:05 AM  Stop time: 4/16/2021 7:12 AM  Reason for block: at surgeon's request and post-op pain management  Performed by  Anesthesiologist: Taye Lopez MD  Assisted by: Vicenta Hanna RN  Preanesthetic Checklist  Completed: patient identified, IV checked, site marked, risks and benefits discussed, surgical consent, monitors and equipment checked, pre-op evaluation and timeout performed  Prep:  Pt Position: right lateral decubitus  Sterile barriers:cap, gloves, mask and washed/disinfected hands  Prep: ChloraPrep  Patient monitoring: blood pressure monitoring and continuous pulse oximetry  Procedure  Sedation:no  Performed under: local infiltration  Guidance:ultrasound guided  ULTRASOUND INTERPRETATION.  Using ultrasound guidance a 21 G gauge needle was placed in close proximity to the sciatic nerve, at which point, under ultrasound guidance anesthetic was injected in the area of the nerve and spread of the anesthesia was seen on ultrasound in close proximity thereto.  There were no abnormalities seen on ultrasound; a digital image was taken; and the patient tolerated the procedure with no complications. Images:still images obtained, printed/placed on chart    Laterality:left  Block Type:popliteal  Injection Technique:single-shot  Needle Type:echogenic  Needle Gauge:21 G      Medications Used: ropivacaine (NAROPIN) 0.5 % injection, 30 mL  Med admintered at 4/16/2021 7:11 AM      Medications  Comment:Pt ID'd  Ultrasound guided  Needle seen throughout  Local infiltration appropriate    Post Assessment  Injection Assessment: negative aspiration for heme, no paresthesia on injection and incremental injection  Patient Tolerance:comfortable throughout block  Complications:no

## 2021-04-20 ENCOUNTER — OFFICE VISIT (OUTPATIENT)
Dept: PODIATRY | Facility: CLINIC | Age: 51
End: 2021-04-20

## 2021-04-20 VITALS — BODY MASS INDEX: 24.1 KG/M2 | HEART RATE: 79 BPM | HEIGHT: 62 IN | WEIGHT: 130.96 LBS | OXYGEN SATURATION: 98 %

## 2021-04-20 DIAGNOSIS — M20.42 HAMMER TOE OF LEFT FOOT: Primary | ICD-10-CM

## 2021-04-20 DIAGNOSIS — M21.622 TAILOR'S BUNION OF LEFT FOOT: ICD-10-CM

## 2021-04-20 DIAGNOSIS — L84 FOOT CALLUS: ICD-10-CM

## 2021-04-20 PROCEDURE — 99024 POSTOP FOLLOW-UP VISIT: CPT | Performed by: PODIATRIST

## 2021-04-20 NOTE — PROGRESS NOTES
Rita Cerda  1970  50 y.o. female     Patient presents today for a post op follow up on the left foot.     04/20/2021      Chief Complaint   Patient presents with   • Left Foot - Post-op Follow-up, Dressing Change           History of Present Illness    Rita Cerda is a 50 y.o. female presents for f/u of left foot tailor's bunionectomy, fourth and fifth hammertoe correction and skin lesion excision.  She is feeling pretty well overall.  Notes some mild lower leg pain with ambulation.  Date of surgery 4/16/2020    Past Medical History:   Diagnosis Date   • Breast cancer (CMS/HCC)     RIGHT LUMPECTOMY   • Callus    • Graves disease    • Hair loss          Past Surgical History:   Procedure Laterality Date   • APPENDECTOMY     • BREAST BIOPSY     • BREAST LUMPECTOMY     • FINGER FRACTURE SURGERY     • HAMMER TOE REPAIR Left 4/16/2021    Procedure: Left fourth and fifth hammertoe repair, tailors bunionectomy, excision skin lesion;  Surgeon: Bakari Wilkerson DPM;  Location: [facility];  Service: Podiatry;  Laterality: Left;   • HYSTERECTOMY     • OOPHORECTOMY           Family History   Problem Relation Age of Onset   • Breast cancer Mother    • Cancer Mother    • Heart disease Mother    • Diabetes Mother    • Hypertension Mother    • Cancer Father    • Heart disease Father    • Hypertension Father          Social History     Socioeconomic History   • Marital status:      Spouse name: Not on file   • Number of children: Not on file   • Years of education: Not on file   • Highest education level: Not on file   Tobacco Use   • Smoking status: Never Smoker   • Smokeless tobacco: Never Used   Vaping Use   • Vaping Use: Never used   Substance and Sexual Activity   • Alcohol use: Never   • Drug use: Never   • Sexual activity: Yes     Partners: Male     Birth control/protection: Surgical         Current Outpatient Medications   Medication Sig Dispense Refill   • Ergocalciferol (VITAMIN D2 PO) Take 1.25 mg  "by mouth 1 (One) Time Per Week.     • HYDROcodone-acetaminophen (Norco) 7.5-325 MG per tablet Take 1 tablet by mouth Every 4 (Four) Hours As Needed for Moderate Pain. 30 tablet 0   • SPIRONOLACTONE PO Take 50 mg by mouth 2 (two) times a day. FOR HAIR LOSS     • Zinc 50 MG capsule Take 50 mg by mouth Daily.       No current facility-administered medications for this visit.         OBJECTIVE    Pulse 79   Ht 157.5 cm (62\")   Wt 59.4 kg (130 lb 15.3 oz)   LMP  (LMP Unknown)   SpO2 98%   BMI 23.95 kg/m²       Review of Systems   Constitutional: Negative.    HENT: Negative.    Eyes: Negative.    Respiratory: Negative.    Cardiovascular: Negative.    Gastrointestinal: Negative.    Endocrine: Negative.         Graves   Genitourinary: Negative.    Musculoskeletal:        Foot pain   Skin: Negative.    Allergic/Immunologic: Negative.    Neurological: Negative.    Hematological: Negative.    Psychiatric/Behavioral:        ADHD         Physical Exam   Constitutional: she appears well-developed and well-nourished.   HEENT: Normocephalic. Atraumatic  CV: No CP. RRR  Resp: Non-labored respirations  Psychiatric: she has a normal mood and affect. her behavior is normal.         Lower Extremity Exam:  Left foot incisions well approximated with suture in place.  No signs of infection  Mild forefoot edema and ecchymosis  Negative Ovidio  Fourth and fifth toes left foot rectus          ASSESSMENT AND PLAN    Diagnoses and all orders for this visit:    1. Hammer toe of left foot (Primary)    2. Foot callus    3. Tailor's bunion of left foot      -Doing well overall postoperatively  -Dressing change today to be left clean dry intact x1 week  -Continue low tide Cam boot for ambulation  -Recheck 1 week, suture removal          This document has been electronically signed by Bakari Wilkerson DPM on April 20, 2021 13:58 CDT     EMR Dragon/Transcription disclaimer:   Much of this encounter note is an electronic " transcription/translation of spoken language to printed text. The electronic translation of spoken language may permit erroneous, or at times, nonsensical words or phrases to be inadvertently transcribed; Although I have reviewed the note for such errors, some may still exist.    Bakari Wilkerson DPM  4/20/2021  13:58 CDT

## 2021-04-28 ENCOUNTER — OFFICE VISIT (OUTPATIENT)
Dept: PODIATRY | Facility: CLINIC | Age: 51
End: 2021-04-28

## 2021-04-28 VITALS — WEIGHT: 130.96 LBS | OXYGEN SATURATION: 97 % | HEART RATE: 54 BPM | BODY MASS INDEX: 24.1 KG/M2 | HEIGHT: 62 IN

## 2021-04-28 DIAGNOSIS — M21.622 TAILOR'S BUNION OF LEFT FOOT: ICD-10-CM

## 2021-04-28 DIAGNOSIS — M20.42 HAMMER TOE OF LEFT FOOT: Primary | ICD-10-CM

## 2021-04-28 PROCEDURE — 99024 POSTOP FOLLOW-UP VISIT: CPT | Performed by: PODIATRIST

## 2021-04-28 NOTE — PROGRESS NOTES
Rita Cerda  1970  50 y.o. female     Patient presents today for a post op follow up on the left foot.     04/28/2021    Chief Complaint   Patient presents with   • Left Foot - Post-op Follow-up, Suture / Staple Removal           History of Present Illness    Rita Cerda is a 50 y.o. female presents for f/u of left foot tailor's bunionectomy, fourth and fifth hammertoe correction and skin lesion excision.  She is feeling pretty well overall.   Date of surgery 4/16/2020    Past Medical History:   Diagnosis Date   • Breast cancer (CMS/HCC)     RIGHT LUMPECTOMY   • Callus    • Graves disease    • Hair loss          Past Surgical History:   Procedure Laterality Date   • APPENDECTOMY     • BREAST BIOPSY     • BREAST LUMPECTOMY     • FINGER FRACTURE SURGERY     • HAMMER TOE REPAIR Left 4/16/2021    Procedure: Left fourth and fifth hammertoe repair, tailors bunionectomy, excision skin lesion;  Surgeon: Bakari Wilkerson DPM;  Location: [facility];  Service: Podiatry;  Laterality: Left;   • HYSTERECTOMY     • OOPHORECTOMY           Family History   Problem Relation Age of Onset   • Breast cancer Mother    • Cancer Mother    • Heart disease Mother    • Diabetes Mother    • Hypertension Mother    • Cancer Father    • Heart disease Father    • Hypertension Father          Social History     Socioeconomic History   • Marital status:      Spouse name: Not on file   • Number of children: Not on file   • Years of education: Not on file   • Highest education level: Not on file   Tobacco Use   • Smoking status: Never Smoker   • Smokeless tobacco: Never Used   Vaping Use   • Vaping Use: Never used   Substance and Sexual Activity   • Alcohol use: Never   • Drug use: Never   • Sexual activity: Yes     Partners: Male     Birth control/protection: Surgical         Current Outpatient Medications   Medication Sig Dispense Refill   • Ergocalciferol (VITAMIN D2 PO) Take 1.25 mg by mouth 1 (One) Time Per Week.     •  "SPIRONOLACTONE PO Take 50 mg by mouth 2 (two) times a day. FOR HAIR LOSS     • Zinc 50 MG capsule Take 50 mg by mouth Daily.     • HYDROcodone-acetaminophen (Norco) 7.5-325 MG per tablet Take 1 tablet by mouth Every 4 (Four) Hours As Needed for Moderate Pain. 30 tablet 0     No current facility-administered medications for this visit.         OBJECTIVE    Pulse 54   Ht 157.5 cm (62\")   Wt 59.4 kg (130 lb 15.3 oz)   LMP  (LMP Unknown)   SpO2 97%   BMI 23.95 kg/m²       Review of Systems   Constitutional: Negative.    HENT: Negative.    Eyes: Negative.    Respiratory: Negative.    Cardiovascular: Negative.    Gastrointestinal: Negative.    Endocrine: Negative.         Graves   Genitourinary: Negative.    Musculoskeletal:        Foot pain   Skin: Negative.    Allergic/Immunologic: Negative.    Neurological: Negative.    Hematological: Negative.    Psychiatric/Behavioral:        ADHD         Physical Exam   Constitutional: she appears well-developed and well-nourished.   HEENT: Normocephalic. Atraumatic  CV: No CP. RRR  Resp: Non-labored respirations  Psychiatric: she has a normal mood and affect. her behavior is normal.         Lower Extremity Exam:  Left foot incisions well approximated with suture in place.  No signs of infection  Mild forefoot edema and ecchymosis  Negative Ovidio  Fourth and fifth toes left foot rectus          ASSESSMENT AND PLAN    Diagnoses and all orders for this visit:    1. Hammer toe of left foot (Primary)  -     XR Foot 3+ View Left      -Doing well overall postoperatively  -Radiographs ordered reviewed  -Sutures removed  -Dressings changed.  May begin to get incision sites wet in 24 hours  -Continue cam boot for ambulation  -Recheck 2 weeks          This document has been electronically signed by Breanna Munguia MA on April 28, 2021 14:06 CDT     EMR Dragon/Transcription disclaimer:   Much of this encounter note is an electronic transcription/translation of spoken language to printed " text. The electronic translation of spoken language may permit erroneous, or at times, nonsensical words or phrases to be inadvertently transcribed; Although I have reviewed the note for such errors, some may still exist.    Breanna Munguia MA  4/28/2021  14:06 CDT

## 2021-05-10 ENCOUNTER — OFFICE VISIT (OUTPATIENT)
Dept: PODIATRY | Facility: CLINIC | Age: 51
End: 2021-05-10

## 2021-05-10 VITALS — OXYGEN SATURATION: 98 % | WEIGHT: 130.96 LBS | BODY MASS INDEX: 24.1 KG/M2 | HEIGHT: 62 IN | HEART RATE: 76 BPM

## 2021-05-10 DIAGNOSIS — M20.42 HAMMER TOE OF LEFT FOOT: Primary | ICD-10-CM

## 2021-05-10 DIAGNOSIS — M21.622 TAILOR'S BUNION OF LEFT FOOT: ICD-10-CM

## 2021-05-10 PROCEDURE — 99024 POSTOP FOLLOW-UP VISIT: CPT | Performed by: PODIATRIST

## 2021-05-10 RX ORDER — IBUPROFEN 600 MG/1
600 TABLET ORAL EVERY 6 HOURS PRN
COMMUNITY
End: 2021-07-02

## 2021-05-10 NOTE — PROGRESS NOTES
Rita Cerda  1970  50 y.o. female     Patient presents today for a post op follow up on the left foot.     05/10/2021      Chief Complaint   Patient presents with   • Left Foot - Post-op Follow-up           History of Present Illness    Rita Cerda is a 50 y.o. female presents for f/u of left foot tailor's bunionectomy, fourth and fifth hammertoe correction and skin lesion excision.  She is feeling pretty well overall.   Date of surgery 4/16/2020    Past Medical History:   Diagnosis Date   • Breast cancer (CMS/HCC)     RIGHT LUMPECTOMY   • Callus    • Graves disease    • Hair loss          Past Surgical History:   Procedure Laterality Date   • APPENDECTOMY     • BREAST BIOPSY     • BREAST LUMPECTOMY     • FINGER FRACTURE SURGERY     • HAMMER TOE REPAIR Left 4/16/2021    Procedure: Left fourth and fifth hammertoe repair, tailors bunionectomy, excision skin lesion;  Surgeon: Bakari Wilkerson DPM;  Location: [facility];  Service: Podiatry;  Laterality: Left;   • HYSTERECTOMY     • OOPHORECTOMY           Family History   Problem Relation Age of Onset   • Breast cancer Mother    • Cancer Mother    • Heart disease Mother    • Diabetes Mother    • Hypertension Mother    • Cancer Father    • Heart disease Father    • Hypertension Father          Social History     Socioeconomic History   • Marital status:      Spouse name: Not on file   • Number of children: Not on file   • Years of education: Not on file   • Highest education level: Not on file   Tobacco Use   • Smoking status: Never Smoker   • Smokeless tobacco: Never Used   Vaping Use   • Vaping Use: Never used   Substance and Sexual Activity   • Alcohol use: Never   • Drug use: Never   • Sexual activity: Yes     Partners: Male     Birth control/protection: Surgical         Current Outpatient Medications   Medication Sig Dispense Refill   • ibuprofen (ADVIL,MOTRIN) 600 MG tablet Take 600 mg by mouth Every 6 (Six) Hours As Needed for Mild Pain .    "  • Ergocalciferol (VITAMIN D2 PO) Take 1.25 mg by mouth 1 (One) Time Per Week.     • SPIRONOLACTONE PO Take 50 mg by mouth 2 (two) times a day. FOR HAIR LOSS     • Zinc 50 MG capsule Take 50 mg by mouth Daily.       No current facility-administered medications for this visit.         OBJECTIVE    Pulse 76   Ht 157.5 cm (62\")   Wt 59.4 kg (130 lb 15.3 oz)   LMP  (LMP Unknown)   SpO2 98%   BMI 23.95 kg/m²       Review of Systems   Constitutional: Negative.    HENT: Negative.    Eyes: Negative.    Respiratory: Negative.    Cardiovascular: Negative.    Gastrointestinal: Negative.    Endocrine: Negative.         Graves   Genitourinary: Negative.    Musculoskeletal:        Foot pain   Skin: Negative.    Allergic/Immunologic: Negative.    Neurological: Negative.    Hematological: Negative.    Psychiatric/Behavioral:        ADHD         Physical Exam   Constitutional: she appears well-developed and well-nourished.   HEENT: Normocephalic. Atraumatic  CV: No CP. RRR  Resp: Non-labored respirations  Psychiatric: she has a normal mood and affect. her behavior is normal.         Lower Extremity Exam:  Left foot incisions well healed.  No signs of infection  Mild forefoot edema   Negative Ovidio  Fourth and fifth toes left foot rectus          ASSESSMENT AND PLAN    Diagnoses and all orders for this visit:    1. Hammer toe of left foot (Primary)  -     XR Foot 3+ View Left    2. Tailor's bunion of left foot  -     XR Foot 3+ View Left      -Doing well overall postoperatively  -Radiographs ordered reviewed  -Continue cam boot for ambulation  -Recheck 3 weeks, likely transition to regular shoe gear          This document has been electronically signed by Bakari Wilkerson DPM on May 10, 2021 14:05 CDT     EMR Dragon/Transcription disclaimer:   Much of this encounter note is an electronic transcription/translation of spoken language to printed text. The electronic translation of spoken language may permit erroneous, or at " times, nonsensical words or phrases to be inadvertently transcribed; Although I have reviewed the note for such errors, some may still exist.    Bakari Wilkerson DPM  5/10/2021  14:05 CDT

## 2021-06-01 ENCOUNTER — OFFICE VISIT (OUTPATIENT)
Dept: PODIATRY | Facility: CLINIC | Age: 51
End: 2021-06-01

## 2021-06-01 VITALS
OXYGEN SATURATION: 97 % | SYSTOLIC BLOOD PRESSURE: 126 MMHG | HEIGHT: 62 IN | WEIGHT: 130 LBS | DIASTOLIC BLOOD PRESSURE: 82 MMHG | HEART RATE: 75 BPM | BODY MASS INDEX: 23.92 KG/M2

## 2021-06-01 DIAGNOSIS — M21.622 TAILOR'S BUNION OF LEFT FOOT: ICD-10-CM

## 2021-06-01 DIAGNOSIS — M20.42 HAMMER TOE OF LEFT FOOT: Primary | ICD-10-CM

## 2021-06-01 PROCEDURE — 99024 POSTOP FOLLOW-UP VISIT: CPT | Performed by: PODIATRIST

## 2021-06-01 RX ORDER — ESTRADIOL 0.5 MG/1
TABLET ORAL
COMMUNITY
Start: 2021-05-17

## 2021-06-01 RX ORDER — HYDROCODONE BITARTRATE AND ACETAMINOPHEN 7.5; 325 MG/1; MG/1
TABLET ORAL
COMMUNITY
Start: 2021-04-16 | End: 2021-07-02

## 2021-06-01 NOTE — PROGRESS NOTES
Rita Cerda  1970  50 y.o. female     Patient presents today for a post op follow up on the left foot.     06/01/2021        Chief Complaint   Patient presents with   • Left Foot - Follow-up, Post-op           History of Present Illness    Rita Cerda is a 50 y.o. female presents for f/u of left foot tailor's bunionectomy, fourth and fifth hammertoe correction and skin lesion excision.  She is feeling pretty well overall.   Date of surgery 4/16/2020    Past Medical History:   Diagnosis Date   • Breast cancer (CMS/HCC)     RIGHT LUMPECTOMY   • Callus    • Graves disease    • Hair loss          Past Surgical History:   Procedure Laterality Date   • APPENDECTOMY     • BREAST BIOPSY     • BREAST LUMPECTOMY     • FINGER FRACTURE SURGERY     • HAMMER TOE REPAIR Left 4/16/2021    Procedure: Left fourth and fifth hammertoe repair, tailors bunionectomy, excision skin lesion;  Surgeon: Bakari Wilkerson DPM;  Location: [facility];  Service: Podiatry;  Laterality: Left;   • HYSTERECTOMY     • OOPHORECTOMY           Family History   Problem Relation Age of Onset   • Breast cancer Mother    • Cancer Mother    • Heart disease Mother    • Diabetes Mother    • Hypertension Mother    • Cancer Father    • Heart disease Father    • Hypertension Father          Social History     Socioeconomic History   • Marital status:      Spouse name: Not on file   • Number of children: Not on file   • Years of education: Not on file   • Highest education level: Not on file   Tobacco Use   • Smoking status: Never Smoker   • Smokeless tobacco: Never Used   Vaping Use   • Vaping Use: Never used   Substance and Sexual Activity   • Alcohol use: Never   • Drug use: Never   • Sexual activity: Yes     Partners: Male     Birth control/protection: Surgical         Current Outpatient Medications   Medication Sig Dispense Refill   • Ergocalciferol (VITAMIN D2 PO) Take 1.25 mg by mouth 1 (One) Time Per Week.     • estradiol (ESTRACE) 0.5  "MG tablet      • HYDROcodone-acetaminophen (NORCO) 7.5-325 MG per tablet every 6 (six) hours as needed.     • ibuprofen (ADVIL,MOTRIN) 600 MG tablet Take 600 mg by mouth Every 6 (Six) Hours As Needed for Mild Pain .     • SPIRONOLACTONE PO Take 50 mg by mouth 2 (two) times a day. FOR HAIR LOSS     • Zinc 50 MG capsule Take 50 mg by mouth Daily.       No current facility-administered medications for this visit.         OBJECTIVE    /82   Pulse 75   Ht 157.5 cm (62\")   Wt 59 kg (130 lb)   LMP  (LMP Unknown)   SpO2 97%   BMI 23.78 kg/m²       Review of Systems   Constitutional: Negative.    HENT: Negative.    Eyes: Negative.    Respiratory: Negative.    Cardiovascular: Negative.    Gastrointestinal: Negative.    Endocrine: Negative.         Graves   Genitourinary: Negative.    Musculoskeletal:        Foot pain   Skin: Negative.    Allergic/Immunologic: Negative.    Neurological: Negative.    Hematological: Negative.    Psychiatric/Behavioral:        ADHD         Physical Exam   Constitutional: she appears well-developed and well-nourished.   HEENT: Normocephalic. Atraumatic  CV: No CP. RRR  Resp: Non-labored respirations  Psychiatric: she has a normal mood and affect. her behavior is normal.         Lower Extremity Exam:  Left foot incisions well healed.  No signs of infection  Mild forefoot edema   Negative Ovidio  Fourth and fifth toes left foot rectus          ASSESSMENT AND PLAN    Diagnoses and all orders for this visit:    1. Hammer toe of left foot (Primary)  -     XR Foot 3+ View Left      -Doing well overall postoperatively  -Radiographs ordered reviewed  -Begin transition back to regular shoes.  May return to work without restriction on 6/14/2021  -Recheck 4 weeks for final radiographs          This document has been electronically signed by Renetta Connolly MA on June 1, 2021 13:06 CDT     EMR Dragon/Transcription disclaimer:   Much of this encounter note is an electronic " transcription/translation of spoken language to printed text. The electronic translation of spoken language may permit erroneous, or at times, nonsensical words or phrases to be inadvertently transcribed; Although I have reviewed the note for such errors, some may still exist.    Renetta Connolly MA  6/1/2021  13:06 CDT

## 2021-06-10 ENCOUNTER — TELEPHONE (OUTPATIENT)
Dept: PODIATRY | Facility: CLINIC | Age: 51
End: 2021-06-10

## 2021-06-10 NOTE — TELEPHONE ENCOUNTER
PT REQUESTS A CALL BACK RE WANTS TO KNOW IF SHORT TERM DISABILITY PAPERS HAVE BEEN FILLED OUT FOR HER TO .  CALL BACK # [phone].  THANK YOU.

## 2021-06-10 NOTE — TELEPHONE ENCOUNTER
Spoke with patient and let her know that I had only ever received [facility] paperwork no short term. She is going to call her short term back and have them re fax the paperwork.

## 2021-07-02 ENCOUNTER — OFFICE VISIT (OUTPATIENT)
Dept: PODIATRY | Facility: CLINIC | Age: 51
End: 2021-07-02

## 2021-07-02 VITALS
SYSTOLIC BLOOD PRESSURE: 138 MMHG | DIASTOLIC BLOOD PRESSURE: 93 MMHG | HEART RATE: 67 BPM | OXYGEN SATURATION: 98 % | HEIGHT: 62 IN | WEIGHT: 130 LBS | BODY MASS INDEX: 23.92 KG/M2

## 2021-07-02 DIAGNOSIS — M21.622 TAILOR'S BUNION OF LEFT FOOT: ICD-10-CM

## 2021-07-02 DIAGNOSIS — M20.42 HAMMER TOE OF LEFT FOOT: Primary | ICD-10-CM

## 2021-07-02 PROCEDURE — 99024 POSTOP FOLLOW-UP VISIT: CPT | Performed by: PODIATRIST

## 2021-07-29 ENCOUNTER — OFFICE VISIT (OUTPATIENT)
Dept: PODIATRY | Facility: CLINIC | Age: 51
End: 2021-07-29

## 2021-07-29 VITALS — OXYGEN SATURATION: 98 % | HEART RATE: 83 BPM | BODY MASS INDEX: 23.92 KG/M2 | WEIGHT: 130 LBS | HEIGHT: 62 IN

## 2021-07-29 DIAGNOSIS — M20.42 HAMMER TOE OF LEFT FOOT: ICD-10-CM

## 2021-07-29 DIAGNOSIS — M21.622 TAILOR'S BUNION OF LEFT FOOT: ICD-10-CM

## 2021-07-29 DIAGNOSIS — T85.848A PAIN FROM IMPLANTED HARDWARE, INITIAL ENCOUNTER: Primary | ICD-10-CM

## 2021-07-29 PROCEDURE — 99212 OFFICE O/P EST SF 10 MIN: CPT | Performed by: PODIATRIST

## 2021-12-21 ENCOUNTER — OFFICE VISIT (OUTPATIENT)
Dept: OBSTETRICS AND GYNECOLOGY | Facility: CLINIC | Age: 51
End: 2021-12-21

## 2021-12-21 VITALS
WEIGHT: 129.8 LBS | SYSTOLIC BLOOD PRESSURE: 136 MMHG | DIASTOLIC BLOOD PRESSURE: 72 MMHG | BODY MASS INDEX: 23.89 KG/M2 | HEIGHT: 62 IN

## 2021-12-21 DIAGNOSIS — N90.5 VULVAR ATROPHY: Primary | ICD-10-CM

## 2021-12-21 DIAGNOSIS — Z85.3 HISTORY OF BREAST CANCER: ICD-10-CM

## 2021-12-21 DIAGNOSIS — E89.40 SURGICAL MENOPAUSE: ICD-10-CM

## 2021-12-21 PROCEDURE — 99204 OFFICE O/P NEW MOD 45 MIN: CPT | Performed by: STUDENT IN AN ORGANIZED HEALTH CARE EDUCATION/TRAINING PROGRAM

## 2021-12-22 RX ORDER — CLOBETASOL PROPIONATE 0.5 MG/G
CREAM TOPICAL
Qty: 45 G | Refills: 2 | Status: SHIPPED | OUTPATIENT
Start: 2021-12-22

## 2021-12-23 NOTE — PROGRESS NOTES
"[facility]  Gynecology  Date of Service: 2021    CC: vaginal tear    HPI  Rita Cerda is a 51 y.o.  premenopausal female who presents with complaints of \"vaginal tear\".      Patient with PMH of breast cancer in her 30's, status post treatment with lumpectomy and radiation with 20 years of remission. She was on Tamoxifen for 5 years and ended up having SHABBIR, BSO through [facility] for risk reduction, reporting there were some \"abnormal precancerous\" cells on ovaries. Reports Karolyn Bragg did start her on Estradiol 0.5 mg daily for mood swings after counseling her thoroughly about risk with h/o breast cancer, reports her oncologist is aware. Her concern is that she has bleeding and significant pain with \"tearing\" at posterior fourchette every time she has penile-vaginal intercourse. This has let to her avoiding sexual intercourse which is upsetting to her and her relationship with her . Reports tried vaginal estrogen for a couple of months but thinks did not help and states her partner was worried about how it could affect him, so she discontinued.     Denies any vaginal itching, burning, irritation, or discharge. No problems outside of intercourse. Denies any abnormal vaginal bleeding except as above. Denies any urinary symptoms including incontinence, dysuria, frequency, urgency, nocturia.    Last Mammogram and US last week, WNL. Denies h/o abnormal paps    ROS  Review of Systems   Constitutional: Negative.    HENT: Negative.    Respiratory: Negative.    Cardiovascular: Negative.    Gastrointestinal: Negative.    Endocrine: Negative.    Genitourinary: Positive for dyspareunia.   Musculoskeletal: Negative.    Skin: Negative.    Psychiatric/Behavioral: Negative.        GYN HISTORY  Menarche: 11  Menses: s/p SHABBIR, BSO   History of STIs: denies  Last pap smear: unknown, prior to Hyst  Last Completed Pap Smear     This patient has no relevant Health Maintenance data.    "     Abnormal pap smear history: denies  Contraception: SHABBIR, postmenopausal     OB HISTORY  OB History    Para Term  AB Living   1 1 1         SAB IAB Ectopic Molar Multiple Live Births                    # Outcome Date GA Lbr Liang/2nd Weight Sex Delivery Anes PTL Lv   1 Term              PAST MEDICAL HISTORY  Past Medical History:   Diagnosis Date   • Breast cancer (HCC)     RIGHT LUMPECTOMY   • Callus    • Graves disease    • Hair loss      PAST SURGICAL HISTORY  Past Surgical History:   Procedure Laterality Date   • APPENDECTOMY     • BREAST BIOPSY     • BREAST LUMPECTOMY     • FINGER FRACTURE SURGERY     • HAMMER TOE REPAIR Left 2021    Procedure: Left fourth and fifth hammertoe repair, tailors bunionectomy, excision skin lesion;  Surgeon: Bakari Wilkerson DPM;  Location: [facility];  Service: Podiatry;  Laterality: Left;   • HYSTERECTOMY     • OOPHORECTOMY       FAMILY HISTORY  Family History   Problem Relation Age of Onset   • Breast cancer Mother    • Cancer Mother    • Heart disease Mother    • Diabetes Mother    • Hypertension Mother    • Cancer Father    • Heart disease Father    • Hypertension Father      SOCIAL HISTORY  Social History     Socioeconomic History   • Marital status:    Tobacco Use   • Smoking status: Never Smoker   • Smokeless tobacco: Never Used   Vaping Use   • Vaping Use: Never used   Substance and Sexual Activity   • Alcohol use: Never   • Drug use: Never   • Sexual activity: Yes     Partners: Male     Birth control/protection: Surgical     ALLERGIES  Allergies   Allergen Reactions   • Amoxicillin Palpitations     HOME MEDICATIONS  Prior to Admission medications    Medication Sig Start Date End Date Taking? Authorizing Provider   estradiol (ESTRACE) 0.5 MG tablet  21  Yes Provider, MD Aby   clobetasol (TEMOVATE) 0.05 % cream Apply pea sized amount to affected area nightly prior to going to bed. 21   Edwige Cerda DO     PE  /72   Ht  "157.5 cm (62\")   Wt 58.9 kg (129 lb 12.8 oz)   LMP  (LMP Unknown)   BMI 23.74 kg/m²        General: Alert, healthy, no distress, well nourished and well developed.  Neurologic: Alert, oriented to person, place, and time.  Gait normal.  Cranial nerves II-XII grossly intact.  HEENT: Normocephalic, atraumatic.  Extraocular muscles intact.  Lungs: Normal respiratory effort.  Clear to auscultation bilaterally.    Heart: Regular rate and rhythm.    Abdomen: Soft, non-tender, non-distended,no masses, no hepatosplenomegaly, no hernia.  Skin: No rash, no lesions.  Extremities: No cyanosis, clubbing or edema.  PELVIC EXAM:  External Genitalia/Vulva: Vulvar and perineal atrophy consistent with hypoestrogenic state, no significant redness of labia, no discharge on vulvar tissues, Scotsdale's and Bartholin's glands are normal, no ulcers, no condylomatous lesions. No evidence of laceration, scarring, ulceration, or other changes specifically at posterior fourchette. Vaginal cuff intact without masses.  Urethral meatus: Normal, no lesions, no prolapse.  Urethra: Normal, no masses, no tenderness with palpation.  Bladder: Normal, no fullness, no masses, no tenderness with palpation.  Vagina: Vaginal tissues are not inflamed, pale color and smooth texture, no significant discharge present.  Pelvic support adequate.  Cervix: surgically absent  Uterus: surgically absent  Adnexa: surgically absent, nontender  Rectal: SHREE deferred.    IMPRESSION  Rita Cerda is a 51 y.o.  with PMH of breast cancer and PSH of SHABBIR/BSO in 30's presenting with report of bleeding and pain at posterior fourchette during intercourse.    PLAN    Surgical menopause  History of breast cancer  Vulvar atrophy  - Tissue appears atrophic secondary to genitourinary syndrome of menopause, surgical menopause in 30's  - Patient on oral Estradiol 0.5 mg prescribed by [facility] Bragg at [facility]; reports oncologist aware, predominantly for mood swings  - Reports " has trialed vaginal estrogen, did not feel it helped and discontinued due to report of increasing headaches and hot flashes as above  - Has trialed multiple lubricants per patient without improvement  - Discussed with patient that there are no concerning findings that would be consistent with lichen sclerosis, tissue ulceration, or other concerns that would need to be biopsied at this time  - First line treatment would be vaginal estrogen, which patient has discontinued with report as above    > as she is on oral estrogen without hot flashes or headaches she reports with estrogen cream, I feel these symptoms were either unrelated to estrogen cream she used previously or were caused by vehicle of estrogen cream    > would still recommend trialing estrogen cream, could use every other day instead of daily; estrogen cream is typically felt to be reasonable with counseling of theoretic risk in breast cancer patients due to minimal systemic absorption; as patient is already on oral estrogen I would want to ensure her oncologist was okay with this; patient however declines at this time  - Discussed could trial vaginal clobetasol to see if this would assist in potential inflammation or tissue irritation; would only trial for short period, up to 2 mos, to see if effective due to risk of thinning of tissue  - No significant narrowing of introitus and no levator spasm; I do not feel dilator use or PFPT would be useful at this time, but could consider in future  - Return in 2 mos for followup, if improving try decreasing frequency of Clobetasol use; if unimporved consider trialing coconut oil or Replens for vaginal moisturizing or discussing vaginal estrogen, potentially with different formulation than prior use; would not recommend increasing oral estrogen as topical should be as if not more effective and oral would have increased risk to patient with h/o breast cancer               This document has been electronically  signed by Edwige Cerda DO on December 23, 2021 15:39 CST

## 2022-03-01 ENCOUNTER — OFFICE VISIT (OUTPATIENT)
Dept: OBSTETRICS AND GYNECOLOGY | Facility: CLINIC | Age: 52
End: 2022-03-01

## 2022-03-01 VITALS
BODY MASS INDEX: 24.8 KG/M2 | HEIGHT: 62 IN | SYSTOLIC BLOOD PRESSURE: 100 MMHG | WEIGHT: 134.8 LBS | DIASTOLIC BLOOD PRESSURE: 80 MMHG

## 2022-03-01 DIAGNOSIS — E89.40 SURGICAL MENOPAUSE: ICD-10-CM

## 2022-03-01 DIAGNOSIS — Z85.3 HISTORY OF BREAST CANCER: ICD-10-CM

## 2022-03-01 DIAGNOSIS — N90.5 VULVAR ATROPHY: ICD-10-CM

## 2022-03-01 DIAGNOSIS — N94.10 FEMALE DYSPAREUNIA: Primary | ICD-10-CM

## 2022-03-01 PROCEDURE — 99213 OFFICE O/P EST LOW 20 MIN: CPT | Performed by: STUDENT IN AN ORGANIZED HEALTH CARE EDUCATION/TRAINING PROGRAM

## 2022-07-14 ENCOUNTER — APPOINTMENT (OUTPATIENT)
Dept: PHYSICAL THERAPY | Age: 52
End: 2022-07-14
Payer: OTHER GOVERNMENT

## 2022-07-27 ENCOUNTER — HOSPITAL ENCOUNTER (OUTPATIENT)
Dept: PHYSICAL THERAPY | Age: 52
Discharge: HOME | End: 2022-07-27
Payer: OTHER GOVERNMENT

## 2022-07-27 PROCEDURE — 97162 PT EVAL MOD COMPLEX 30 MIN: CPT

## 2022-07-27 PROCEDURE — 97110 THERAPEUTIC EXERCISES: CPT

## 2022-07-27 NOTE — PROGRESS NOTES
"Hanna Campo is a 63 year old female who is being evaluated via a billable video visit.      The patient has been notified of following:     \"This video visit will be conducted via a call between you and your physician/provider. We have found that certain health care needs can be provided without the need for an in-person physical exam.  This service lets us provide the care you need with a video conversation.  If a prescription is necessary we can send it directly to your pharmacy.  If lab work is needed we can place an order for that and you can then stop by our lab to have the test done at a later time.    Video visits are billed at different rates depending on your insurance coverage.  Please reach out to your insurance provider with any questions.    If during the course of the call the physician/provider feels a video visit is not appropriate, you will not be charged for this service.\"    Patient has given verbal consent for Video visit? Yes  How would you like to obtain your AVS? MyChart  If you are dropped from the video visit, the video invite should be resent to: Text to cell phone: see nursing documentation  Will anyone else be joining your video visit? No        Video-Visit Details    Type of service:  Video Visit    Video Duration 40 minutes    Originating Location (pt. Location): Home    Distant Location (provider location):  [facility]     Platform used for Video Visit: Jie Morales MD      Interval History:  Has just started new 15mcg butrans patch, having side effects from the higher dose (hallucinations, vivid dreams)    Will be due in 2 weeks for butrans patch refill, in 20 days will need to refill for 90 tabs (3/day) of norco    PLAN:  Okay to take 3 tabs per day of Norco  Continue Butrans patch 10 mcg    Will be due for patch refill in 2 weeks and norco refill in 20 days (due to increased dose)    F/u 6 weeks  " [facility] PHYSICAL THERAPY  [x]  At [facility] [address] [place] [address]. [address] [facility] [place] , [address] [facility] [address] - Phone: [phone] Fax: [phone] / [address] [facility]  Patient Name: Dawit Castano : 1970   Medical   Diagnosis: Mixed incontinence [N39.46]   Treatment Diagnosis: Mixed incontinence [N39.46]  Other muscle spasm [M62.838]   Onset Date: 2022     Referral Source: Rivka Montenegro MD [facility]): 2022   Prior Hospitalization: See medical history Provider #: [id number]   Prior Level of Function: Frequent urination for past 10 years   Comorbidities: , Pre diabetes, Migraines, Appendectomy   Medications: Verified on Patient Summary List   The Plan of Care and following information is based on the information from the initial evaluation.   ==================================================================================  Assessment / key information: Patient is a 46 y.o. yo female  with vaginal deliveries who presents to [facility] with diagnosis of Mixed incontinence [N39.46]  Other muscle spasm [M62.838]. Patient reports frequent urination at 13+ times dailly, needing to push around anus/perineum to have a bowel movement, dyspareunia with initial and deep penetration, pain inserting and wearing tampons, urinary leaking more then 1x weekly, strong urgency with inability to delay no > 10 minutes and nocturia 4+ times nightly. She strains consistently with bowel movements. She has limited the frequency of sexual intercourse due to pain. Patient wears pantishields for protection. Patient is active duty working as  for [employer]. Patient presents to PT with severely impaired strength of pelvic floor muscles scoring 2/0/0/4  on PERF. There was tenderness to palpation over bilateral pubococcygeus muscles vaginally. A grade 2 rectocele is present.   Biofeedback to the pelvic floor muscles was deferred to the first follow up secondary to time constraints. Patient scored 45 on FOTO/Urinary problem indicating decreased quality of life. Patient can benefit from PT for retraining of muscle control on biofeedback, manual therapy and behaviour modification to increase pelvic floor muscle strength, ease of bowel movements and ability to engage in sex and use tampons, decrease urinary incontinence, urgency and nocturia.    ==================================================================================  Eval Complexity: History: MEDIUM  Complexity : 1-2 comorbidities / personal factors will impact the outcome/ POC Exam:HIGH Complexity : 4+ Standardized tests and measures addressing body structure, function, activity limitation and / or participation in recreation  Presentation: MEDIUM Complexity : Evolving with changing characteristics  Clinical Decision Making:MEDIUM Complexity : FOTO score of 26-74Overall Complexity:MEDIUM  Problem List: Pelvic pain/dysfunction, Decreased pelvic floor mm awareness, Decreased pelvic floor mm strength, Improper voiding habits, Hypertonus of pelvic floor, Urinary urgency, and Other  Treatment Plan may include any combination of the following: Therapeutic exercise, Urge suppression techniques, Neuromuscular re-education, Manual therapy, Physical agent/modality, Patient education, and Other  Patient / Family readiness to learn indicated by: asking questions, trying to perform skills, and interest  Persons(s) to be included in education: patient (P)  Barriers to Learning/Limitations: None  Measures taken:    Patient Goal (s): \"Control frequency of urination, understand diagnosis, reason for pain during intercourse or having to push around rectum. \"   Patient self reported health status: fair  Rehabilitation Potential: good  Short Term Goals: To be accomplished in 4 weeks:   1. Patient performing pelvic floor exercises 3x day.      2. Patient will report 25% subjective improvement in urinary incontinence with ADLs. 3. Patient will increase net rise of fast twitch contraction by 5 microvolts to increase continence. 4. Patient using urge suppression techniques. 5. Patient using normal toileting techniques  Long Term Goals: To be accomplished in 8 weeks:   1. Patient independent in HEP     2. Patient will increase sore on FOTO/Urinary Problem to 56 indicating improved continence and quality of life. 3. Patient will increase net rise of fast twitch contraction by 10 microvolts to increase continence. 4. Nocturia decreased to 2x nightly. 5. Patient will report 50% improvement in pain with sexual intercourse. Frequency / Duration:   Patient to be seen  1  times per week for 8  weeks:  Patient / Caregiver education and instruction:    Therapist Signature: Candelario Wiggins PT Date: 5/66/5471   Certification Period: na Time: 10:43 AM   ==================================================================================  I certify that the above Physical Therapy Services are being furnished while the patient is under my care. I agree with the treatment plan and certify that this therapy is necessary. Physician Signature:        Date:       Time:     Please sign and return to [facility] or you may fax the signed copy to [phone]. Thank you.     Cheryle Box, MD

## 2022-07-27 NOTE — PROGRESS NOTES
PELVIC FLOOR DAILY TREATMENT NOTE 8-    Patient Name: Carol Madrigal  Date:2022  : 1970  [x]  Patient  Verified  Payor: [employer] / Plan: Evelio Rubio 74 / Product Type:  /    In time:8:18  Out time:9:12  Total Treatment Time (min): 47    Visit #: 1 of 8    Treatment Area: Mixed incontinence [N39.46]  Other muscle spasm [M62.838]    SUBJECTIVE  Pain Level (0-10 scale): 5 low back  Any medication changes, allergies to medications, adverse drug reactions, diagnosis change, or new procedure performed?: [x] No    [] Yes (see summary sheet for update)  Subjective functional status/changes:   [] No changes reported  See POC    OBJECTIVE      Billed As:   [x] TE 15 min   [] TA   [] Neuro   [] Self Care Patient Education: [x] Review POC/Goals    Educated Pt in pelvic floor anatomy, function/dysfunction. [] Progressed/Changed HEP based on:   [] positioning   [] body mechanics   [] transfers   [] heat/ice application    [] other:        Pain Level (0-10 scale) post treatment: 5    ASSESSMENT/Changes in Function:   Justification for Eval Code Complexity:  Patient History : See POC  Examination see exam   Clinical Presentation: evolving  Clinical Decision Making : FOTO : 45 /100    Patient will continue to benefit from skilled PT services to modify and progress therapeutic interventions, address strength deficits, analyze and address soft tissue restrictions, instruct in home and community integration, and Address PADMAJA, increased urinary frequency, nocturia, straining with bowel movements, dyspareuina  to attain remaining goals. [x]  See Plan of Care  []  See progress note/recertification  []  See Discharge Summary         Progress towards goals / Updated goals:  Initial evaluation and education initiated.        PLAN  [x]  Upgrade activities as tolerated     []  Continue plan of care  []  Update interventions per flow sheet       []  Discharge due to:_  []  Other:_      Eneida Boss, PT 7/27/2022  9:12 AM      No future appointments.

## 2022-08-01 ENCOUNTER — APPOINTMENT (OUTPATIENT)
Dept: PHYSICAL THERAPY | Age: 52
End: 2022-08-01

## 2022-08-04 ENCOUNTER — HOSPITAL ENCOUNTER (OUTPATIENT)
Dept: PHYSICAL THERAPY | Age: 52
Discharge: HOME | End: 2022-08-04
Payer: OTHER GOVERNMENT

## 2022-08-04 PROCEDURE — 97112 NEUROMUSCULAR REEDUCATION: CPT

## 2022-08-04 NOTE — PROGRESS NOTES
PELVIC FLOOR DAILY TREATMENT NOTE  1-    Patient Name: Aisha Kanner  Date:2022  : 1970  [x]  Patient  Verified  Payor:  / Plan: Evelio Rubio 74 / Product Type:  /    In time:3:08  Out time:3:36  Total Treatment Time (min): 28        Visit #: 2 of 8    Treatment Area: Mixed incontinence [N39.46]    SUBJECTIVE  Pain Level (0-10 scale): 0 LBP  Any medication changes, allergies to medications, adverse drug reactions, diagnosis change, or new procedure performed?: [x] No    [] Yes (see summary sheet for update)  Subjective functional status/changes:   [x] No changes reported  OBJECTIVE  Modality rationale: Increase pelvic floor muscle strength and Improve quality of pelvic floor contractions in order to Increase urinary continence, Decrease urinary urgency, Increase ability to delay urination, Decrease frequency of urination, and Decrease nocturia.    Min Type Additional Details   28 [x] Biofeedback x 28 minutes    supine surface    [] Estim: []Att   []Unatt        []TENS instruct                  []IFC  []Premod   []NMES                     []Other:  []w/US   []w/ice   []w/heat  Position:  Location:    []  Traction: [] Cervical       []Lumbar                       [] Prone          []Supine                       []Intermittent   []Continuous Lbs:  [] before manual  [] after manual    []  Ultrasound: []Continuous   [] Pulsed                           []1MHz   []3MHz Location:  W/cm2:    []  Iontophoresis with dexamethasone         Location: [] Take home patch   [] In clinic    []  Ice     []  heat  []  Ice massage Position:  Location:    []  Vasopneumatic Device Pressure:       [] lo [] med [] hi   Temperature: [] lo [] med [] hi   [x] Skin assessment post-treatment:  [x]intact []redness- no adverse reaction       []redness - adverse reaction:      min Therapeutic Exercise:  [] See flow sheet :  []  Pelvic floor strengthening                []  Pelvic floor downtraining  [] Quality pelvic floor contractions      []  Relaxation techniques  []  Urge suppression exercises  []  Other: Core strengthening   Rationale: Increase core strength in order to Increase urinary continence. min Therapeutic Activity:  []  See flow sheet :   Rationale: Inhibit abnormal muscle activity and increase LE circulatio  in order to Decrease nocturia and Improve frequency and ease of bowel movements. min Manual Therapy:    Rationale: Increase tissue extensibility of the pelvic floor muscles and Inhibit abnormal muscle activity in order to Improve frequency and ease of bowel movements, Improve ability to engage in sexual intercourse, Improve ability to undergo a gynecological exam, and Improve ability to perform ADLs. The manual therapy interventions were performed at a separate and distinct time from the therapeutic activities interventions. Billed with:   [] TE   [] TA   [x] Neuro   [] Self Care Patient Education: [x] Review HEP   Initiated pelvic floor HEP 2x day supine, 1x day seated with 5 second holds. [] Progressed/Changed HEP based on:   [] positioning   [] body mechanics   [] transfers   [] heat/ice application    [] other:        Other Objective/Functional Measures:    [x]baseline resting tone: WNLs   [x]slow twitch mms 20. 6(16.09) in supine. [x]fast twitch mms 31(20.91) . Pain Level (0-10 scale) post treatment: 0    ASSESSMENT/Changes in Function: Patient demonstrates impaired strength of pelvic floor muscles on biofeedback. Patient will continue to benefit from skilled PT services to modify and progress therapeutic interventions, address strength deficits, analyze and address soft tissue restrictions, instruct in home and community integration, and Address PADMAJA, increased urinary frequency, nocturia, straining with bowel movements, dyspareuina  to attain remaining goals.      []  See Plan of Care  []  See progress note/recertification  []  See Discharge Summary         Progress towards goals / Updated goals:  First follow up after initial evaluation                 1. Patient performing pelvic floor exercises 3x day. 2. Patient will report 25% subjective improvement in urinary incontinence with ADLs. 3. Patient will increase net rise of fast twitch contraction by 5 microvolts to increase continence. 4. Patient using urge suppression techniques.                  5. Patient using normal toileting techniques    PLAN  []  Upgrade activities as tolerated     []  Continue plan of care  []  Update interventions per flow sheet       []  Discharge due to:_  []  Other:_      Pradip Sheppard PT 8/4/2022  3:36PM      Future Appointments   Date Time Provider Mark Shearer   8/4/2022  2:45 PM Ariane Mccurdy, PT [facility]   8/10/2022  1:15 PM Ariane Mccurdy, PT [facility]   8/18/2022  3:30 PM Ariane Mccurdy, PT [facility]   8/24/2022 10:30 AM Ariane Mccurdy, PT [facility]

## 2022-08-10 ENCOUNTER — APPOINTMENT (OUTPATIENT)
Dept: PHYSICAL THERAPY | Age: 52
End: 2022-08-10
Payer: OTHER GOVERNMENT

## 2022-08-10 ENCOUNTER — HOSPITAL ENCOUNTER (OUTPATIENT)
Dept: PHYSICAL THERAPY | Age: 52
Discharge: HOME | End: 2022-08-10
Payer: OTHER GOVERNMENT

## 2022-08-10 PROCEDURE — 97112 NEUROMUSCULAR REEDUCATION: CPT

## 2022-08-10 PROCEDURE — 97530 THERAPEUTIC ACTIVITIES: CPT

## 2022-08-10 NOTE — PROGRESS NOTES
PELVIC FLOOR DAILY TREATMENT NOTE  1-    Patient Name: Zaian Marshall  Date:8/10/2022  : 1970  [x]  Patient  Verified  Payor:  / Plan: [facility] / Product Type:  /    In time: 1:15  Out time: 2:00  Total Treatment Time (min): 45        Visit #: 3 of 8    Treatment Area: Mixed incontinence [N39.46]    SUBJECTIVE  Pain Level (0-10 scale): 0 LBP  Any medication changes, allergies to medications, adverse drug reactions, diagnosis change, or new procedure performed?: [x] No    [] Yes (see summary sheet for update)  Subjective functional status/changes:   [] No changes reported  Patient reports that she has been on leave but was able to do HEP 4 days 3x day . OBJECTIVE  Modality rationale: Increase pelvic floor muscle strength and Improve quality of pelvic floor contractions in order to Increase urinary continence, Decrease urinary urgency, Increase ability to delay urination, Decrease frequency of urination, and Decrease nocturia.    Min Type Additional Details   30 [x] Biofeedback x 30 minutes     supine surface    [] Estim: []Att   []Unatt        []TENS instruct                  []IFC  []Premod   []NMES                     []Other:  []w/US   []w/ice   []w/heat  Position:  Location:    []  Traction: [] Cervical       []Lumbar                       [] Prone          []Supine                       []Intermittent   []Continuous Lbs:  [] before manual  [] after manual    []  Ultrasound: []Continuous   [] Pulsed                           []1MHz   []3MHz Location:  W/cm2:    []  Iontophoresis with dexamethasone         Location: [] Take home patch   [] In clinic    []  Ice     []  heat  []  Ice massage Position:  Location:    []  Vasopneumatic Device Pressure:       [] lo [] med [] hi   Temperature: [] lo [] med [] hi   [x] Skin assessment post-treatment:  [x]intact []redness- no adverse reaction       []redness - adverse reaction:      min Therapeutic Exercise:  [] See flow sheet :  []  Pelvic floor strengthening                []  Pelvic floor downtraining  []  Quality pelvic floor contractions      []  Relaxation techniques  []  Urge suppression exercises  []  Other: Core strengthening   Rationale: Increase core strength in order to Increase urinary continence. 15 min Therapeutic Activity:  []  See flow sheet : sleep longer exercises and education   Rationale: Inhibit abnormal muscle activity and increase LE circulatio  in order to Decrease nocturia and Improve frequency and ease of bowel movements. min Manual Therapy:    Rationale: Increase tissue extensibility of the pelvic floor muscles and Inhibit abnormal muscle activity in order to Improve frequency and ease of bowel movements, Improve ability to engage in sexual intercourse, Improve ability to undergo a gynecological exam, and Improve ability to perform ADLs. The manual therapy interventions were performed at a separate and distinct time from the therapeutic activities interventions. Billed with:   [] TE   [] TA   [x] Neuro   [] Self Care Patient Education: [x] Review HEP      [x] Progressed/Changed HEP based on: Continue pelvic floor HEP 2x day supine, 1x day seated  with 5 second holds. Add sleep longer exercises  [] positioning   [] body mechanics   [] transfers   [] heat/ice application    [] other:        Other Objective/Functional Measures:    [x]baseline resting tone: WNLs   [x]slow twitch mms 24. 9(20.750 in supine. [x]fast twitch mms 22.6(13.16). Pain Level (0-10 scale) post treatment: 0    ASSESSMENT/Changes in Function: Patient demonstrates improved strength of slow twitch pelvic floor muscles on biofeedback. But decreased strength of fast twitch muscles. Patient needed further education in correct execution. Initiated sleep longer exercises.     Patient will continue to benefit from skilled PT services to modify and progress therapeutic interventions, address strength deficits, analyze and address soft tissue restrictions, instruct in home and community integration, and Address PADMAJA, increased urinary frequency, nocturia, straining with bowel movements, dyspareuina  to attain remaining goals. []  See Plan of Care  []  See progress note/recertification  []  See Discharge Summary         Progress towards goals / Updated goals:  Patient achieved STG #1 for HEP 3x day. 1. Patient performing pelvic floor exercises 3x day. 2. Patient will report 25% subjective improvement in urinary incontinence with ADLs. 3. Patient will increase net rise of fast twitch contraction by 5 microvolts to increase continence. 4. Patient using urge suppression techniques.                  5. Patient using normal toileting techniques    PLAN  [x]  Upgrade activities as tolerated     []  Continue plan of care  []  Update interventions per flow sheet       []  Discharge due to:_  []  Other:_      Eneida Boss, PT 8/10/2022  2:00 PM      Future Appointments   Date Time Provider Mark Shearer   8/10/2022  1:15 PM Elvin Mohano,  [facility]   8/18/2022  3:00 PM Elvin Pyo,  [facility]   8/24/2022 10:30 AM Elvin Pyo,  [facility]

## 2022-08-24 ENCOUNTER — HOSPITAL ENCOUNTER (OUTPATIENT)
Dept: PHYSICAL THERAPY | Age: 52
Discharge: HOME | End: 2022-08-24
Payer: OTHER GOVERNMENT

## 2022-08-24 PROCEDURE — 97110 THERAPEUTIC EXERCISES: CPT

## 2022-08-24 PROCEDURE — 97112 NEUROMUSCULAR REEDUCATION: CPT

## 2022-08-24 NOTE — PROGRESS NOTES
PELVIC FLOOR DAILY TREATMENT NOTE  1-    Patient Name: Elvin Fu  Date:2022  : 1970  [x]  Patient  Verified  Payor:  / Plan: [facility] / Product Type:  /    In time: 10:40 Out time: 11:20  Total Treatment Time (min): 40        Visit #: 4 of 8    Treatment Area: Mixed incontinence [N39.46]    SUBJECTIVE  Pain Level (0-10 scale): 0 LBP  Any medication changes, allergies to medications, adverse drug reactions, diagnosis change, or new procedure performed?: [] No    [x] Yes (see summary sheet for update)  Subjective functional status/changes:   [] No changes reported  Patient reports doing HEP 2x day. Too busy to do it during work. Sleep longer exercises didn't work. Stopping water intake at 8:00. Has premarin. Not sure how to use it. OBJECTIVE  Modality rationale: Increase pelvic floor muscle strength and Improve quality of pelvic floor contractions in order to Increase urinary continence, Decrease urinary urgency, Increase ability to delay urination, Decrease frequency of urination, and Decrease nocturia.    Min Type Additional Details   25 [x] Biofeedback x 25 minutes   Seated and  supine surface    [] Estim: []Att   []Unatt        []TENS instruct                  []IFC  []Premod   []NMES                     []Other:  []w/US   []w/ice   []w/heat  Position:  Location:    []  Traction: [] Cervical       []Lumbar                       [] Prone          []Supine                       []Intermittent   []Continuous Lbs:  [] before manual  [] after manual    []  Ultrasound: []Continuous   [] Pulsed                           []1MHz   []3MHz Location:  W/cm2:    []  Iontophoresis with dexamethasone         Location: [] Take home patch   [] In clinic    []  Ice     []  heat  []  Ice massage Position:  Location:    []  Vasopneumatic Device Pressure:       [] lo [] med [] hi   Temperature: [] lo [] med [] hi   [x] Skin assessment post-treatment:  [x]intact []redness- no adverse reaction       []redness - adverse reaction:      min Therapeutic Exercise:  [] See flow sheet :  []  Pelvic floor strengthening                []  Pelvic floor downtraining  []  Quality pelvic floor contractions      []  Relaxation techniques  []  Urge suppression exercises  []  Other: Core strengthening   Rationale: Increase core strength in order to Increase urinary continence. min Therapeutic Activity:  []  See flow sheet :    Rationale: Inhibit abnormal muscle activity and increase LE circulatio  in order to Decrease nocturia and Improve frequency and ease of bowel movements. min Manual Therapy:    Rationale: Increase tissue extensibility of the pelvic floor muscles and Inhibit abnormal muscle activity in order to Improve frequency and ease of bowel movements, Improve ability to engage in sexual intercourse, Improve ability to undergo a gynecological exam, and Improve ability to perform ADLs. The manual therapy interventions were performed at a separate and distinct time from the therapeutic activities interventions. Billed as:   [x] TE 15 min   [] TA   [] Neuro   [] Self Care Patient Education: [x] Review HEP   Discussed ways  to be consistent with midday exercise. Educated on bio identical hormones. How to use premarin to help with pain during initial penetration. [x] Progressed/Changed HEP based on: Continue pelvic floor HEP 2x day supine, 1x day seated  with 5 second holds. Add sleep longer exercises  [] positioning   [] body mechanics   [] transfers   [] heat/ice application    [] other:        Other Objective/Functional Measures:    [x]baseline resting tone: WNLs   [x]slow twitch mms 42.5(33.58) in supine. [x]fast twitch mms 45.1(28.97). Pain Level (0-10 scale) post treatment: 0    ASSESSMENT/Changes in Function: Patient demonstrates improved strength of pelvic floor muscles in supine with fair HEP compliance.     Patient will continue to benefit from skilled PT services to modify and progress therapeutic interventions, address strength deficits, analyze and address soft tissue restrictions, instruct in home and community integration, and Address PADMAJA, increased urinary frequency, nocturia, straining with bowel movements, dyspareuina  to attain remaining goals. []  See Plan of Care  []  See progress note/recertification  []  See Discharge Summary         Progress towards goals / Updated goals:  Patient achieved STG #3 for strengthening with fast twitch contractions increased from 20.91 to 28.97 micro volts. 1. Patient performing pelvic floor exercises 3x day. 2. Patient will report 25% subjective improvement in urinary incontinence with ADLs. 3. Patient will increase net rise of fast twitch contraction by 5 microvolts to increase continence. 4. Patient using urge suppression techniques.                  5. Patient using normal toileting techniques    PLAN  [x]  Upgrade activities as tolerated     []  Continue plan of care  []  Update interventions per flow sheet       []  Discharge due to:_  []  Other:_      Vivi Macias PT 8/24/2022  11:20 AM      Future Appointments   Date Time Provider aMrk Shearer   8/24/2022 10:30 AM Keya Fernandez, PT [id number]

## 2022-09-14 ENCOUNTER — HOSPITAL ENCOUNTER (OUTPATIENT)
Dept: PHYSICAL THERAPY | Age: 52
Discharge: HOME | End: 2022-09-14
Payer: OTHER GOVERNMENT

## 2022-09-14 PROCEDURE — 97110 THERAPEUTIC EXERCISES: CPT

## 2022-09-14 PROCEDURE — 97112 NEUROMUSCULAR REEDUCATION: CPT

## 2022-09-14 NOTE — PROGRESS NOTES
[address] [facility]    [x]  At [facility] [address] [address] - Phone: [phone] Fax: [phone]  PROGRESS NOTE  Patient Name: Madeleine Covarrubias : 1970   Treatment/Medical Diagnosis: Mixed incontinence [N39.46]   Referral Source: Arslan Miller MD     Date of Initial Visit: 2022 Attended Visits: 5 Missed Visits: 3   SUMMARY OF TREATMENT  PT has consisted of pelvic floor relaxation/strengthening via biofeedback, education as to pelvic floor anatomy and function/sleep longer exercises and home exercise program.   CURRENT STATUS  Patient has made good progress in PT with short term goals either met or progressing. Functional progress Includes patient reporting 60% improvement in urinary leaking. She had one leak in the past 2 weeks. Nocturia has decreased from 5-6x to 4x. Continued functional impairments include increased urinary frequency, urgency, urinary incontinence, decreased ease of bowel movements, dyspareunia and nocturia. Patient demonstrates improved but still impaired pelvic floor muscle strength. Goal/Measure of Progress Goal Met? 1. Patient performing pelvic floor exercises 3x day   Status at last Eval: na Current Status: 3x day yes   2. Patient will report 25% subjective improvement in urinary incontinence with ADLs. Status at last Eval: na Current Status: 60% yes   3. Increase net rise of fast twitch contraction by 5 microvolts to increase urinary continence during ADLs. 4.  Patient using urge suppression techniques. 5.  Patient using normal toileting techniques. Status at last Eval: 20.91 micro volts  na  na Current Status: 33.58 micro volts  Ongoing  ongoing yes   New Goals to be achieved in __4__  weeks:                 1. Patient independent in HEP                   2. Patient will increase sore on FOTO/Urinary Problem to 56 indicating improved continence and quality of life. 3. Patient will increase net rise of fast twitch contraction by 15 microvolts to increase continence. 4. Nocturia decreased to 2x nightly. 5. Patient will report 50% improvement in pain with sexual intercourse. RECOMMENDATIONS  Continue pelvic floor PT 1x week for 4 weeks. If you have any questions/comments please contact us directly at [phone]. Thank you for allowing us to assist in the care of your patient. Therapist Signature: Eriberto Nixon PT Date: 9/14/2022     Time: 2:57   NOTE TO PHYSICIAN:  PLEASE COMPLETE THE ORDERS BELOW AND FAX TO   [facility]: [phone]  If you are unable to process this request in 24 hours please contact our office: [phone].    ___ I have read the above report and request that my patient continue as recommended.   ___ I have read the above report and request that my patient continue therapy with the following changes/special instructions:_________________________________________________________   ___ I have read the above report and request that my patient be discharged from therapy.      Physician Signature:        Date:       Time:                                                                                                           Rivka Montenegro MD

## 2022-09-14 NOTE — PROGRESS NOTES
PELVIC FLOOR DAILY TREATMENT NOTE  1-    Patient Name: Jayden Hilario  Date:2022  : 1970  [x]  Patient  Verified  Payor:  / Plan: [facility] REGION / Product Type:  /    In time: 2:02 Out time: 2:51  Total Treatment Time (min): 49        Visit #: 5 of 8    Treatment Area: Mixed incontinence [N39.46]    SUBJECTIVE  Pain Level (0-10 scale): 0 LBP  Any medication changes, allergies to medications, adverse drug reactions, diagnosis change, or new procedure performed?: [] No    [x] Yes (see summary sheet for update)  Subjective functional status/changes:   [] No changes reported  See PN  OBJECTIVE  Modality rationale: Increase pelvic floor muscle strength and Improve quality of pelvic floor contractions in order to Increase urinary continence, Decrease urinary urgency, Increase ability to delay urination, Decrease frequency of urination, and Decrease nocturia.    Min Type Additional Details   23 [x] Biofeedback x 23 minutes   Seated and  supine surface    [] Estim: []Att   []Unatt        []TENS instruct                  []IFC  []Premod   []NMES                     []Other:  []w/US   []w/ice   []w/heat  Position:  Location:    []  Traction: [] Cervical       []Lumbar                       [] Prone          []Supine                       []Intermittent   []Continuous Lbs:  [] before manual  [] after manual    []  Ultrasound: []Continuous   [] Pulsed                           []1MHz   []3MHz Location:  W/cm2:    []  Iontophoresis with dexamethasone         Location: [] Take home patch   [] In clinic    []  Ice     []  heat  []  Ice massage Position:  Location:    []  Vasopneumatic Device Pressure:       [] lo [] med [] hi   Temperature: [] lo [] med [] hi   [x] Skin assessment post-treatment:  [x]intact []redness- no adverse reaction       []redness - adverse reaction:      min Therapeutic Exercise:  [] See flow sheet :  []  Pelvic floor strengthening                []  Pelvic floor downtraining  []  Quality pelvic floor contractions      []  Relaxation techniques  []  Urge suppression exercises  []  Other: Core strengthening   Rationale: Increase core strength in order to Increase urinary continence. min Therapeutic Activity:  []  See flow sheet :    Rationale: Inhibit abnormal muscle activity and increase LE circulatio  in order to Decrease nocturia and Improve frequency and ease of bowel movements. min Manual Therapy:    Rationale: Increase tissue extensibility of the pelvic floor muscles and Inhibit abnormal muscle activity in order to Improve frequency and ease of bowel movements, Improve ability to engage in sexual intercourse, Improve ability to undergo a gynecological exam, and Improve ability to perform ADLs. The manual therapy interventions were performed at a separate and distinct time from the therapeutic activities interventions. Billed as:   [x] TE 26 min   [] TA   [] Neuro   [] Self Care Patient Education: [x] Review HEP   Discussed progress including functional improvements and continued functional impairments. Urge suppression strategies. [x] Progressed/Changed HEP based on: Advance to  pelvic floor HEP 3x day seated  with 7 second holds. Begin urge suppression strategies in her home. [] positioning   [] body mechanics   [] transfers   [] heat/ice application    [] other:        Other Objective/Functional Measures:    []baseline resting tone:    [x]slow twitch mms    [x]fast twitch mms     Pain Level (0-10 scale) post treatment: 0    ASSESSMENT/Changes in Function: See PN    Patient will continue to benefit from skilled PT services to modify and progress therapeutic interventions, address strength deficits, analyze and address soft tissue restrictions, instruct in home and community integration, and Address PADMAJA, increased urinary frequency, nocturia, straining with bowel movements, dyspareuina  to attain remaining goals.      []  See Plan of Care  [x]  See progress note/recertification  []  See Discharge Summary         Progress towards goals / Updated goals:  See PN  PLAN  [x]  Upgrade activities as tolerated     []  Continue plan of care  []  Update interventions per flow sheet       []  Discharge due to:_  []  Other:_      Eric Hyman, PT 9/14/2022  2:51 PM      Future Appointments   Date Time Provider Mark Shearer   9/14/2022  2:00 PM Shawanda Oh, PT [facility]   9/21/2022  1:15 PM Shawanda Oh, PT [facility]   10/6/2022  8:45 AM Shawanda Oh, PT [facility]   10/13/2022 11:25 AM Shawanda Oh, PT [facility]   10/19/2022 11:25 AM Shawanda Oh, PT [facility]   10/26/2022 11:25 AM Shawanda Oh, PT [facility]

## 2022-09-21 ENCOUNTER — HOSPITAL ENCOUNTER (OUTPATIENT)
Dept: PHYSICAL THERAPY | Age: 52
Discharge: HOME | End: 2022-09-21
Payer: OTHER GOVERNMENT

## 2022-09-21 PROCEDURE — 97110 THERAPEUTIC EXERCISES: CPT

## 2022-09-21 PROCEDURE — 97530 THERAPEUTIC ACTIVITIES: CPT

## 2022-09-21 PROCEDURE — 97112 NEUROMUSCULAR REEDUCATION: CPT

## 2022-09-21 NOTE — PROGRESS NOTES
PELVIC FLOOR DAILY TREATMENT NOTE      Patient Name: Nellie Villela  Date:2022  : 1970  [x]  Patient  Verified  Payor:  / Plan: [facility] / Product Type:  /    In time: 1:22 Out time: 2:04  Total Treatment Time (min): 42        Visit #: 6 of 8    Treatment Area: Mixed incontinence [N39.46]    SUBJECTIVE  Pain Level (0-10 scale): 0 LBP  Any medication changes, allergies to medications, adverse drug reactions, diagnosis change, or new procedure performed?: [x] No    [] Yes (see summary sheet for update)  Subjective functional status/changes:   [] No changes reported  Patient reports not as consistent with HEP. Using urge suppression techniques. OBJECTIVE  Modality rationale: Increase pelvic floor muscle strength and Improve quality of pelvic floor contractions in order to Increase urinary continence, Decrease urinary urgency, Increase ability to delay urination, Decrease frequency of urination, and Decrease nocturia.    Min Type Additional Details   17 [x] Biofeedback x 17 minutes    Seated surface    [] Estim: []Att   []Unatt        []TENS instruct                  []IFC  []Premod   []NMES                     []Other:  []w/US   []w/ice   []w/heat  Position:  Location:    []  Traction: [] Cervical       []Lumbar                       [] Prone          []Supine                       []Intermittent   []Continuous Lbs:  [] before manual  [] after manual    []  Ultrasound: []Continuous   [] Pulsed                           []1MHz   []3MHz Location:  W/cm2:    []  Iontophoresis with dexamethasone         Location: [] Take home patch   [] In clinic    []  Ice     []  heat  []  Ice massage Position:  Location:    []  Vasopneumatic Device Pressure:       [] lo [] med [] hi   Temperature: [] lo [] med [] hi   [x] Skin assessment post-treatment:  [x]intact []redness- no adverse reaction       []redness - adverse reaction:      min Therapeutic Exercise:  [] See flow sheet :  []  Pelvic floor strengthening                []  Pelvic floor downtraining  []  Quality pelvic floor contractions      []  Relaxation techniques  []  Urge suppression exercises  []  Other: Core strengthening   Rationale: Increase core strength in order to Increase urinary continence. 15 min Therapeutic Activity:  []  See flow sheet : Toileting techniques on biofeedback. Big belly, hard belly. Rationale: Inhibit abnormal muscle activity and increase LE circulatio  in order to Decrease nocturia and Improve frequency and ease of bowel movements. min Manual Therapy:    Rationale: Increase tissue extensibility of the pelvic floor muscles and Inhibit abnormal muscle activity in order to Improve frequency and ease of bowel movements, Improve ability to engage in sexual intercourse, Improve ability to undergo a gynecological exam, and Improve ability to perform ADLs. The manual therapy interventions were performed at a separate and distinct time from the therapeutic activities interventions. Billed as:   [x] TE 10 min   [] TA   [] Neuro   [] Self Care Patient Education: [x] Review HEP   Patient educated in belly breathing and issued bladder diary with instruction in completion. [x] Progressed/Changed HEP based on: Advance to  pelvic floor HEP 2x day seated, 1x day standing  with 7 second holds. Continue urge suppression strategies in her home. Belly breathing 1x day. [] positioning   [] body mechanics   [] transfers   [] heat/ice application    [] other:        Other Objective/Functional Measures:    []baseline resting tone:    [x]slow twitch mms 43.6(31.98) seated   [x]fast twitch mms 38.4(23.82)     Pain Level (0-10 scale) post treatment: 0    ASSESSMENT/Changes in Function: Patient demonstrates improved strength of pelvic floor muscles in sitting. Toileting techniques are abnormal on biofeedback. Patient having difficulty performing belly breathing.     Patient will continue to benefit from skilled PT services to modify and progress therapeutic interventions, address strength deficits, analyze and address soft tissue restrictions, instruct in home and community integration, and Address PADMAJA, increased urinary frequency, nocturia, straining with bowel movements, dyspareuina  to attain remaining goals. []  See Plan of Care  []  See progress note/recertification  []  See Discharge Summary         Progress towards goals / Updated goals:  Patient progressing towards LTG #1.                 1. Patient independent in HEP                   2. Patient will increase sore on FOTO/Urinary Problem to 56 indicating improved continence and quality of life. 3. Patient will increase net rise of fast twitch contraction by 15 microvolts to increase continence. 4. Nocturia decreased to 2x nightly. 5. Patient will report 50% improvement in pain with sexual intercourse.    PLAN  [x]  Upgrade activities as tolerated     []  Continue plan of care  []  Update interventions per flow sheet       []  Discharge due to:_  []  Other:_      Sixto Leyva PT 9/21/2022  2:04 PM      Future Appointments   Date Time Provider Mark Shearer   9/21/2022  1:15 PM Matilda Rodríguez PT [facility]   10/6/2022  8:45 AM Matilda Rodríguez PT [facility]   10/13/2022 11:25 AM Matilda Rodríguez PT [facility]   10/19/2022 11:25 AM Matilda Rodríguez PT [facility]   10/26/2022 11:25 AM Matilda Rodríguez PT [facility]

## 2022-10-06 ENCOUNTER — HOSPITAL ENCOUNTER (OUTPATIENT)
Dept: PHYSICAL THERAPY | Age: 52
Discharge: HOME | End: 2022-10-06
Payer: OTHER GOVERNMENT

## 2022-10-06 PROCEDURE — 97112 NEUROMUSCULAR REEDUCATION: CPT

## 2022-10-06 PROCEDURE — 97110 THERAPEUTIC EXERCISES: CPT

## 2022-10-06 NOTE — PROGRESS NOTES
PELVIC FLOOR DAILY TREATMENT NOTE  1-    Patient Name: Nadege Job  Date:10/6/2022  : 1970  [x]  Patient  Verified  Payor:  / Plan: Syeda Fu / Product Type:  /    In time: 8:52 Out time: 9:29  Total Treatment Time (min): 37        Visit #: 7 of 8    Treatment Area: Mixed incontinence [N39.46]    SUBJECTIVE  Pain Level (0-10 scale): 0 LBP  Any medication changes, allergies to medications, adverse drug reactions, diagnosis change, or new procedure performed?: [x] No    [] Yes (see summary sheet for update)  Subjective functional status/changes:   [] No changes reported  Patient reports completing bladder diary. HEP 3x day. 60% improved re sexual intercourse. OBJECTIVE  Modality rationale: Increase pelvic floor muscle strength and Improve quality of pelvic floor contractions in order to Increase urinary continence, Decrease urinary urgency, Increase ability to delay urination, Decrease frequency of urination, and Decrease nocturia.    Min Type Additional Details   27 [x] Biofeedback x 27 minutes    standing with surface electrodes    [] Estim: []Att   []Unatt        []TENS instruct                  []IFC  []Premod   []NMES                     []Other:  []w/US   []w/ice   []w/heat  Position:  Location:    []  Traction: [] Cervical       []Lumbar                       [] Prone          []Supine                       []Intermittent   []Continuous Lbs:  [] before manual  [] after manual    []  Ultrasound: []Continuous   [] Pulsed                           []1MHz   []3MHz Location:  W/cm2:    []  Iontophoresis with dexamethasone         Location: [] Take home patch   [] In clinic    []  Ice     []  heat  []  Ice massage Position:  Location:    []  Vasopneumatic Device Pressure:       [] lo [] med [] hi   Temperature: [] lo [] med [] hi   [x] Skin assessment post-treatment:  [x]intact []redness- no adverse reaction       []redness - adverse reaction:      min Therapeutic Exercise:  [] See flow sheet :  []  Pelvic floor strengthening                []  Pelvic floor downtraining  []  Quality pelvic floor contractions      []  Relaxation techniques  []  Urge suppression exercises  []  Other: Core strengthening   Rationale: Increase core strength in order to Increase urinary continence. min Therapeutic Activity:  []  See flow sheet : Toileting techniques on biofeedback. Big belly, hard belly. Rationale: Inhibit abnormal muscle activity and increase LE circulatio  in order to Decrease nocturia and Improve frequency and ease of bowel movements. min Manual Therapy:    Rationale: Increase tissue extensibility of the pelvic floor muscles and Inhibit abnormal muscle activity in order to Improve frequency and ease of bowel movements, Improve ability to engage in sexual intercourse, Improve ability to undergo a gynecological exam, and Improve ability to perform ADLs. The manual therapy interventions were performed at a separate and distinct time from the therapeutic activities interventions. Billed as:   [x] TE 10 min   [] TA   [] Neuro   [] Self Care Patient Education: [x] Review HEP   Review of bladder diary and education on decreasing frequency of urination. [x] Progressed/Changed HEP based on: Advance to  pelvic floor HEP 3x day standing for fat twitch, 1x day standing  with 10 second holds for slow twitch. Continue urge suppression strategies. Belly breathing 1x day. [] positioning   [] body mechanics   [] transfers   [] heat/ice application    [] other:        Other Objective/Functional Measures: Average daily urination 11x   []baseline resting tone:    [x]slow twitch mms 41(32.35) [x]fast twitch mms 42.9(26.69)  Pain Level (0-10 scale) post treatment: 0    ASSESSMENT/Changes in Function:   Patient demonstrates improved strength of pelvic floor muscles in standing.     Patient will continue to benefit from skilled PT services to modify and progress therapeutic interventions, address strength deficits, analyze and address soft tissue restrictions, instruct in home and community integration, and Address PADMAJA, increased urinary frequency, nocturia, straining with bowel movements, dyspareuina  to attain remaining goals. []  See Plan of Care  []  See progress note/recertification  []  See Discharge Summary         Progress towards goals / Updated goals:  Patient met LTG #5 with report of pain with sex 50-60% improved. 1. Patient independent in HEP                   2. Patient will increase sore on FOTO/Urinary Problem to 56 indicating improved continence and quality of life. 3. Patient will increase net rise of fast twitch contraction by 15 microvolts to increase continence. 4. Nocturia decreased to 2x nightly. 5. Patient will report 50% improvement in pain with sexual intercourse.    PLAN  [x]  Upgrade activities as tolerated     []  Continue plan of care  []  Update interventions per flow sheet       []  Discharge due to:_  []  Other:_      Prabhjot Turk, PT 10/6/2022  9:29 AM      Future Appointments   Date Time Provider Mark Shearer   10/6/2022  8:45 AM Tempie Gails,  [facility]   10/13/2022 11:25 AM Tempie Gails,  [facility]   10/19/2022 11:25 AM Tempie Gails,  [facility]   10/26/2022 11:25 AM Tempie Gails,  [facility]

## 2022-10-13 ENCOUNTER — APPOINTMENT (OUTPATIENT)
Dept: PHYSICAL THERAPY | Age: 52
End: 2022-10-13
Payer: OTHER GOVERNMENT

## 2022-10-19 ENCOUNTER — HOSPITAL ENCOUNTER (OUTPATIENT)
Dept: PHYSICAL THERAPY | Age: 52
Discharge: HOME | End: 2022-10-19
Payer: OTHER GOVERNMENT

## 2022-10-19 PROCEDURE — 97112 NEUROMUSCULAR REEDUCATION: CPT

## 2022-10-19 PROCEDURE — 97110 THERAPEUTIC EXERCISES: CPT

## 2022-10-19 NOTE — PROGRESS NOTES
PELVIC FLOOR DAILY TREATMENT NOTE  1-21    Patient Name: Mariano Carrasquillo  Date:10/19/2022  : 1970  [x]  Patient  Verified  Payor:  / Plan: [facility]  [facility] REGION / Product Type:  /    In time: 11:39 Out time: 12:15  Total Treatment Time (min): 36        Visit #: 8 of 8    Treatment Area: Mixed incontinence [N39.46]    SUBJECTIVE  Pain Level (0-10 scale): 0 LBP  Any medication changes, allergies to medications, adverse drug reactions, diagnosis change, or new procedure performed?: [x] No    [] Yes (see summary sheet for update)  Subjective functional status/changes:   [] No changes reported  See PN. OBJECTIVE  Modality rationale: Increase pelvic floor muscle strength and Improve quality of pelvic floor contractions in order to Increase urinary continence, Decrease urinary urgency, Increase ability to delay urination, Decrease frequency of urination, and Decrease nocturia.    Min Type Additional Details   13 [x] Biofeedback x 13 minutes    standing with surface electrodes    [] Estim: []Att   []Unatt        []TENS instruct                  []IFC  []Premod   []NMES                     []Other:  []w/US   []w/ice   []w/heat  Position:  Location:    []  Traction: [] Cervical       []Lumbar                       [] Prone          []Supine                       []Intermittent   []Continuous Lbs:  [] before manual  [] after manual    []  Ultrasound: []Continuous   [] Pulsed                           []1MHz   []3MHz Location:  W/cm2:    []  Iontophoresis with dexamethasone         Location: [] Take home patch   [] In clinic    []  Ice     []  heat  []  Ice massage Position:  Location:    []  Vasopneumatic Device Pressure:       [] lo [] med [] hi   Temperature: [] lo [] med [] hi   [x] Skin assessment post-treatment:  [x]intact []redness- no adverse reaction       []redness - adverse reaction:      min Therapeutic Exercise:  [] See flow sheet :  []  Pelvic floor strengthening                [] Pelvic floor downtraining  []  Quality pelvic floor contractions      []  Relaxation techniques  []  Urge suppression exercises  []  Other: Core strengthening   Rationale: Increase core strength in order to Increase urinary continence. min Therapeutic Activity:  []  See flow sheet : Toileting techniques on biofeedback. Big belly, hard belly. Rationale: Inhibit abnormal muscle activity and increase LE circulatio  in order to Decrease nocturia and Improve frequency and ease of bowel movements. min Manual Therapy:    Rationale: Increase tissue extensibility of the pelvic floor muscles and Inhibit abnormal muscle activity in order to Improve frequency and ease of bowel movements, Improve ability to engage in sexual intercourse, Improve ability to undergo a gynecological exam, and Improve ability to perform ADLs. The manual therapy interventions were performed at a separate and distinct time from the therapeutic activities interventions. Billed as:   [x] TE 23 min   [] TA   [] Neuro   [] Self Care Patient Education: [x] Review HEP   Discussed progress including functional improvements and continued functional impairments. Patient completed FOTO and results discussed. [x] Progressed/Changed HEP based on: Continue  pelvic floor HEP 3x day standing for fast twitch, 1x day standing  with 10 second holds for slow twitch. Continue urge suppression strategies. Belly breathing 1x day. [] positioning   [] body mechanics   [] transfers   [] heat/ice application    [] other:        Other Objective/Functional Measures:    []baseline resting tone:    [x]slow twitch mms    [x]fast twitch mms     Pain Level (0-10 scale) post treatment: 0    ASSESSMENT/Changes in Function:   See PN.     Patient will continue to benefit from skilled PT services to modify and progress therapeutic interventions, address strength deficits, analyze and address soft tissue restrictions, instruct in home and community integration, and Address PADMAJA, increased urinary frequency, nocturia, straining with bowel movements, dyspareuina  to attain remaining goals. []  See Plan of Care  []  See progress note/recertification  []  See Discharge Summary          Progress towards goals / Updated goals:    See PN.      PLAN  [x]  Upgrade activities as tolerated     []  Continue plan of care  []  Update interventions per flow sheet       []  Discharge due to:_  []  Other:_      Chan Desai, PT 10/19/2022  12:15 PM      Future Appointments   Date Time Provider Mark Shearer   11/2/2022 11:25 AM Mina Carcamo, PT [id number]

## 2022-10-19 NOTE — PROGRESS NOTES
[address] PHYSICAL THERAPY  [x]  At [facility] [address] [address] - Phone: [phone] Fax: [phone]    Progress Note  Patient Name: Karo Rico : 1970   Treatment/Medical Diagnosis: Mixed incontinence [N39.46]   Referral Source: Sterling Cheng MD     Date of Initial Visit: 2022 Attended Visits: 8 Missed Visits: 4     SUMMARY OF TREATMENT  PT has consisted of pelvic floor relaxation/strengthening via biofeedback, education as to pelvic floor anatomy and function/sleep longer exercises and home exercise program.     CURRENT STATUS  Patient has made steady progress in PT with long term goals partially met. Functional progress Includes nocturia decreased from 4+ times nightly to 2x. She has no pain with sexual intercourse if she uses an estrogen cream.  She reports 50% improvement in PADMAJA. She continues to have leaking accompanied with urgency. Pelvic floor muscle strength although improved is still impaired. She can benefit from continuing PT to address these issues. Goal/Measure of Progress Goal Met? 1. Patient independent in HEP. Status at last Eval: progressing Current Status: progressing progressing   2. Patient will increase score on FOTO/Urinary problem to 56 indicating improved continence and quality of life. Status at last Eval: 45 Current Status: 45 no   3.  3. Patient will increase net rise of fast twitch contraction by 15 microvolts to increase continence. 4. Nocturia decreased to 2x nightly. 5. Patient will report 50% improvement in pain with sexual intercourse.    Status at last Eval: 20.91 micro volts at initial eval  4+times nightly  60% Current Status: 26.63 micro volts  2x nightly  60% Progressing  Yes   steadyyes     New Goals to be achieved in __4__  weeks:                 1. Patient independent in HEP                   2. Patient will report 75% improvement in urinary incontinence with ADLs. 3. Patient will increase net rise of fast twitch contraction by 15 microvolts to increase continence. RECOMMENDATIONS  Continue pelvic floor PT 1x week for 4 weeks. If you have any questions/comments please contact us directly at [phone]. Thank you for allowing us to assist in the care of your patient. Therapist Signature: Isac Brothers PT Date: 10/19/2022       Time: 1:10 PM   NOTE TO PHYSICIAN:  PLEASE COMPLETE THE ORDERS BELOW AND FAX TO   [facility]: [phone]  If you are unable to process this request in 24 hours please contact our office: [phone].     ___ I have read the above report and request that my patient continue as recommended.   ___ I have read the above report and request that my patient continue therapy with the following changes/special instructions:_________________________________________________________   ___ I have read the above report and request that my patient be discharged from therapy.       Physician Signature:                                                           Date:                               Time:                                                                                                                       Kalli Jacinto MD

## 2022-10-26 ENCOUNTER — APPOINTMENT (OUTPATIENT)
Dept: PHYSICAL THERAPY | Age: 52
End: 2022-10-26
Payer: OTHER GOVERNMENT

## 2022-11-02 ENCOUNTER — HOSPITAL ENCOUNTER (OUTPATIENT)
Dept: PHYSICAL THERAPY | Age: 52
Discharge: HOME | End: 2022-11-02
Payer: OTHER GOVERNMENT

## 2022-11-02 PROCEDURE — 97112 NEUROMUSCULAR REEDUCATION: CPT

## 2022-11-02 PROCEDURE — 97110 THERAPEUTIC EXERCISES: CPT

## 2022-11-02 NOTE — PROGRESS NOTES
PELVIC FLOOR DAILY TREATMENT NOTE  1-    Patient Name: Marvin Catherine  Date:2022  : 1970  [x]  Patient  Verified  Payor:  / Plan: Evelio Rubio 74 / Product Type:  /    In time: 11:33 Out time: 12:20  Total Treatment Time (min): 52        Visit #: 9 of 12    Treatment Area: Mixed incontinence [N39.46]    SUBJECTIVE  Pain Level (0-10 scale): 0 LBP  Any medication changes, allergies to medications, adverse drug reactions, diagnosis change, or new procedure performed?: [x] No    [] Yes (see summary sheet for update)  Subjective functional status/changes:   [] No changes reported  Patient reports that she is better able to delay urination. OBJECTIVE  Modality rationale: Increase pelvic floor muscle strength and Improve quality of pelvic floor contractions in order to Increase urinary continence, Decrease urinary urgency, Increase ability to delay urination, Decrease frequency of urination, and Decrease nocturia.    Min Type Additional Details   23 [x] Biofeedback x 23 minutes    standing with surface electrodes    [] Estim: []Att   []Unatt        []TENS instruct                  []IFC  []Premod   []NMES                     []Other:  []w/US   []w/ice   []w/heat  Position:  Location:    []  Traction: [] Cervical       []Lumbar                       [] Prone          []Supine                       []Intermittent   []Continuous Lbs:  [] before manual  [] after manual    []  Ultrasound: []Continuous   [] Pulsed                           []1MHz   []3MHz Location:  W/cm2:    []  Iontophoresis with dexamethasone         Location: [] Take home patch   [] In clinic    []  Ice     []  heat  []  Ice massage Position:  Location:    []  Vasopneumatic Device Pressure:       [] lo [] med [] hi   Temperature: [] lo [] med [] hi   [x] Skin assessment post-treatment:  [x]intact []redness- no adverse reaction       []redness - adverse reaction:      min Therapeutic Exercise:  [] See flow sheet :  [] Pelvic floor strengthening                []  Pelvic floor downtraining  []  Quality pelvic floor contractions      []  Relaxation techniques  []  Urge suppression exercises  []  Other: Core strengthening   Rationale: Increase core strength in order to Increase urinary continence. min Therapeutic Activity:  []  See flow sheet : Toileting techniques on biofeedback. Big belly, hard belly. Rationale: Inhibit abnormal muscle activity and increase LE circulatio  in order to Decrease nocturia and Improve frequency and ease of bowel movements. min Manual Therapy:    Rationale: Increase tissue extensibility of the pelvic floor muscles and Inhibit abnormal muscle activity in order to Improve frequency and ease of bowel movements, Improve ability to engage in sexual intercourse, Improve ability to undergo a gynecological exam, and Improve ability to perform ADLs. The manual therapy interventions were performed at a separate and distinct time from the therapeutic activities interventions. Billed as:   [x] TE 24 min   [] TA   [] Neuro   [] Self Care Patient Education: [x] Review HEP   Bladder fitness. Review of bladder diary  [x] Progressed/Changed HEP based on: Continue  pelvic floor HEP 3x day standing for fast twitch, 1x day standing  with 10 second holds for slow twitch. Continue urge suppression strategies. Belly breathing 1x day. [] positioning   [] body mechanics   [] transfers   [] heat/ice application    [] other:        Other Objective/Functional Measures:    []baseline resting tone:    [x]slow twitch mms 41.4(33.71) standing   [x]fast twitch mms 47. 9(31.14) standing    Pain Level (0-10 scale) post treatment: 0    ASSESSMENT/Changes in Function:   Patient demonstrates improved strength of pelvic floor muscles in standing and reports improved ability to delay urination. Her urinary frequency is still high at 11x during the day. .    Patient will continue to benefit from skilled PT services to modify and progress therapeutic interventions, address strength deficits, analyze and address soft tissue restrictions, instruct in home and community integration, and Address PADMAJA, increased urinary frequency, nocturia, straining with bowel movements, dyspareuina  to attain remaining goals. []  See Plan of Care  []  See progress note/recertification  []  See Discharge Summary          Progress towards goals / Updated goals:  Barbaraeent progressing with LTG #3 as fast twitch contraction  increased from 20.91 micro volts in supine to 31.14 in standing  . 1. Patient independent in HEP                   2. Patient will report 75% improvement in urinary incontinence with ADLs. 3. Patient will increase net rise of fast twitch contraction by 15 microvolts to increase continence.     PLAN  [x]  Upgrade activities as tolerated     []  Continue plan of care  []  Update interventions per flow sheet       []  Discharge due to:_  []  Other:_      Cedric Castañeda, PT 11/2/2022  12:15 PM      Future Appointments   Date Time Provider Mark Shearer   11/9/2022 11:25 AM Benedicto Russ, PT [facility]   11/23/2022  2:30 PM Benedicto Russ, PT [id number]

## 2022-11-09 ENCOUNTER — APPOINTMENT (OUTPATIENT)
Dept: PHYSICAL THERAPY | Age: 52
End: 2022-11-09
Payer: OTHER GOVERNMENT

## 2022-11-23 ENCOUNTER — HOSPITAL ENCOUNTER (OUTPATIENT)
Dept: PHYSICAL THERAPY | Age: 52
Discharge: HOME | End: 2022-11-23
Payer: OTHER GOVERNMENT

## 2022-11-23 PROCEDURE — 97112 NEUROMUSCULAR REEDUCATION: CPT

## 2022-11-23 PROCEDURE — 97110 THERAPEUTIC EXERCISES: CPT

## 2022-11-23 NOTE — THERAPY DISCHARGE
[address] [facility] PHYSICAL THERAPY  [x]  At [facility]. [address] [place] [address] [address] [address] - Phone: [phone] Fax: [phone]    DISCHARGE SUMMARY  Patient Name: Roopa Reyes : 1970   Treatment/Medical Diagnosis: Mixed incontinence [N39.46]   Referral Source: Ziyad Thibodeaux MD     Date of Initial Visit: 2022 Attended Visits: 10 Missed Visits: 8     SUMMARY OF TREATMENT  PT has consisted of pelvic floor relaxation/strengthening via biofeedback, education as to pelvic floor anatomy and function/sleep longer exercises, urge suppression strategies/normal toileting techniques and home exercise program.     CURRENT STATUS  Patient has made good progress in PT with long term goals either met or progressing. Functional progress Includes patient reporting 80% subjective improvement in urinary leaking. Goal/Measure of Progress Goal Met? 1. Patient independent in HEP. Status at last Eval: progressing Current Status: Pt independent yes   2. Patient will report 75% improvement in urinary incontinence with ADLs. Status at last Eval: 60% Current Status: 80% yes   3. Increase net rise of  fast twitch contraction by15 micro volts to increase continence. Status at last Eval: 20.91 micro volts at initial eval Current Status: 31.14 in standing progressing     RECOMMENDATIONS  Discontinue therapy. Progressing towards or have reached established goals. .  Patient to continue on home exercise program.  If you have any questions/comments please contact us directly at [phone]. Thank you for allowing us to assist in the care of your patient. Therapist Signature:  Wally Lenz PT Date: 2022     Time: 2:53 PM     Ziyad Thibodeaux MD

## 2022-11-23 NOTE — PROGRESS NOTES
PELVIC FLOOR DAILY TREATMENT NOTE  1-    Patient Name: Annemarie Cohen  Date:2022  : 1970  [x]  Patient  Verified  Payor:  / Plan: [facility] REGION / Product Type:  /    In time: 2:44 Out time: 3:23  Total Treatment Time (min): 39        Visit #: 10 of 12    Treatment Area: Mixed incontinence [N39.46]    SUBJECTIVE  Pain Level (0-10 scale): 0 LBP  Any medication changes, allergies to medications, adverse drug reactions, diagnosis change, or new procedure performed?: [x] No    [] Yes (see summary sheet for update)  Subjective functional status/changes:   [] No changes reported  Patient reports that she knows that she needs to get in 12 cups of water for her pee to be clear. 80% improved re urinary leaking. OBJECTIVE  Modality rationale: Increase pelvic floor muscle strength and Improve quality of pelvic floor contractions in order to Increase urinary continence, Decrease urinary urgency, Increase ability to delay urination, Decrease frequency of urination, and Decrease nocturia.    Min Type Additional Details   24 [x] Biofeedback x 24 minutes    standing with surface electrodes    [] Estim: []Att   []Unatt        []TENS instruct                  []IFC  []Premod   []NMES                     []Other:  []w/US   []w/ice   []w/heat  Position:  Location:    []  Traction: [] Cervical       []Lumbar                       [] Prone          []Supine                       []Intermittent   []Continuous Lbs:  [] before manual  [] after manual    []  Ultrasound: []Continuous   [] Pulsed                           []1MHz   []3MHz Location:  W/cm2:    []  Iontophoresis with dexamethasone         Location: [] Take home patch   [] In clinic    []  Ice     []  heat  []  Ice massage Position:  Location:    []  Vasopneumatic Device Pressure:       [] lo [] med [] hi   Temperature: [] lo [] med [] hi   [x] Skin assessment post-treatment:  [x]intact []redness- no adverse reaction       []redness - adverse reaction:     Billed as:   [x] TE 15  min   [] TA   [] Neuro   [] Self Care Patient Education: [x] Review HEP   Discussed progress including functional improvements and continued functional impairments. [x] Progressed/Changed HEP based on: Discharge instructions:  Continue  pelvic floor HEP 3x day standing for fast twitch, 1x day standing  with 10 second holds for slow twitch until taper. Continue urge suppression strategies. Belly breathing 1x day. [] positioning   [] body mechanics   [] transfers   [] heat/ice application    [] other:        Other Objective/Functional Measures:    []baseline resting tone:    [x]slow twitch mms    [x]fast twitch mms   Pain Level (0-10 scale) post treatment: 0    ASSESSMENT/Changes in Function:   See D/C summary. []  See Plan of Care  []  See progress note/recertification  [x]  See Discharge Summary          Progress towards goals / Updated goals:  See D/C summary.   PLAN  []  Upgrade activities as tolerated     []  Continue plan of care  []  Update interventions per flow sheet       [x]  Discharge due to:_program completed   []  Other:_      Yocasta Bhatti, PT 11/23/2022  3:23 PM      Future Appointments   Date Time Provider Mark Shearer   11/23/2022  2:30 PM Mya Buckley, PT [address] [id number]

## 2025-06-03 NOTE — PROGRESS NOTES
[facility] Rita Cerda  1970  50 y.o. female     Patient presents today for a post op follow up on the left foot.     07/02/2021    Chief Complaint   Patient presents with   • Left Foot - Follow-up           History of Present Illness    Rita Cerda is a 50 y.o. female presents for f/u of left foot tailor's bunionectomy, fourth and fifth hammertoe correction and skin lesion excision.  She is feeling pretty well overall.   Date of surgery 4/16/2020.  Does note that she has been having some increased sharp tenderness beneath her fifth metatarsal head with prolonged weightbearing.  This seems to be somewhat improved following changing her shoes recently.    Past Medical History:   Diagnosis Date   • Breast cancer (CMS/HCC)     RIGHT LUMPECTOMY   • Callus    • Graves disease    • Hair loss          Past Surgical History:   Procedure Laterality Date   • APPENDECTOMY     • BREAST BIOPSY     • BREAST LUMPECTOMY     • FINGER FRACTURE SURGERY     • HAMMER TOE REPAIR Left 4/16/2021    Procedure: Left fourth and fifth hammertoe repair, tailors bunionectomy, excision skin lesion;  Surgeon: Bakari Wilkerson DPM;  Location: [facility];  Service: Podiatry;  Laterality: Left;   • HYSTERECTOMY     • OOPHORECTOMY           Family History   Problem Relation Age of Onset   • Breast cancer Mother    • Cancer Mother    • Heart disease Mother    • Diabetes Mother    • Hypertension Mother    • Cancer Father    • Heart disease Father    • Hypertension Father          Social History     Socioeconomic History   • Marital status:      Spouse name: Not on file   • Number of children: Not on file   • Years of education: Not on file   • Highest education level: Not on file   Tobacco Use   • Smoking status: Never Smoker   • Smokeless tobacco: Never Used   Vaping Use   • Vaping Use: Never used   Substance and Sexual Activity   • Alcohol use: Never   • Drug use: Never   • Sexual activity: Yes     Partners: Male     Birth  "control/protection: Surgical         Current Outpatient Medications   Medication Sig Dispense Refill   • estradiol (ESTRACE) 0.5 MG tablet        No current facility-administered medications for this visit.         OBJECTIVE    /93   Pulse 67   Ht 157.5 cm (62\")   Wt 59 kg (130 lb)   LMP  (LMP Unknown)   SpO2 98%   BMI 23.78 kg/m²       Review of Systems   Constitutional: Negative.    HENT: Negative.    Eyes: Negative.    Respiratory: Negative.    Cardiovascular: Negative.    Gastrointestinal: Negative.    Endocrine: Negative.         Graves   Genitourinary: Negative.    Musculoskeletal:        Foot pain   Skin: Negative.    Allergic/Immunologic: Negative.    Neurological: Negative.    Hematological: Negative.    Psychiatric/Behavioral:        ADHD         Physical Exam   Constitutional: she appears well-developed and well-nourished.   HEENT: Normocephalic. Atraumatic  CV: No CP. RRR  Resp: Non-labored respirations  Psychiatric: she has a normal mood and affect. her behavior is normal.         Lower Extremity Exam:  Left foot incisions well healed.  No signs of infection  Mild forefoot edema   Negative Ovidio  Fourth and fifth toes left foot rectus  Tenderness palpation of plantar fifth MTPJ.          ASSESSMENT AND PLAN    Diagnoses and all orders for this visit:    1. Hammer toe of left foot (Primary)  -     XR Foot 3+ View Left    2. Tailor's bunion of left foot  -     XR Foot 3+ View Left      -Doing well overall postoperatively  -Radiographs ordered reviewed.  Continues to have slow healing of osteotomy site noted on lateral radiograph.  -Seems her pinpoint tenderness may be related to single screw fixation.  Briefly discussed possibility of screw removal in future as indicated however would allow for continued healing of fifth metatarsal site prior to this.  -Tinea regular shoes, activity as tolerated  -Recheck 4 weeks           This document has been electronically signed by Breanna Munguia MA on " July 2, 2021 10:14 CDT     EMR Dragon/Transcription disclaimer:   Much of this encounter note is an electronic transcription/translation of spoken language to printed text. The electronic translation of spoken language may permit erroneous, or at times, nonsensical words or phrases to be inadvertently transcribed; Although I have reviewed the note for such errors, some may still exist.    Breanna Munguia MA  7/2/2021  10:14 CDT

## (undated) DEVICE — DISPOSABLE TOURNIQUET CUFF SINGLE BLADDER, DUAL PORT AND QUICK CONNECT CONNECTOR: Brand: COLOR CUFF

## (undated) DEVICE — SPNG GZ WOVN 4X4IN 12PLY 10/BX STRL

## (undated) DEVICE — SOL IRR NACL 0.9PCT BT 1000ML

## (undated) DEVICE — CONTAINER,SPECIMEN,OR STERILE,4OZ: Brand: MEDLINE

## (undated) DEVICE — GOWN,PREVENTION PLUS,XLONG/XLARGE,STRL: Brand: MEDLINE

## (undated) DEVICE — STERILE POLYISOPRENE POWDER-FREE SURGICAL GLOVES WITH EMOLLIENT COATING: Brand: PROTEXIS

## (undated) DEVICE — PK POD 60

## (undated) DEVICE — GLV SURG SENSICARE PI PF LF 7 GRN STRL

## (undated) DEVICE — 9165 UNIVERSAL PATIENT PLATE: Brand: 3M™

## (undated) DEVICE — K-WIRE
Type: IMPLANTABLE DEVICE | Site: TOE FIFTH | Status: NON-FUNCTIONAL
Brand: ASNIS
Removed: 2021-04-16

## (undated) DEVICE — PRECISION THIN (5.5 X 0.38 X 18.0MM)

## (undated) DEVICE — GLV SURG SENSICARE POLYISPRN W/ALOE PF LF 6.5 GRN STRL

## (undated) DEVICE — GLV SURG SENSICARE PI LF PF 7.5 GRN STRL

## (undated) DEVICE — ANTIBACTERIAL UNDYED BRAIDED (POLYGLACTIN 910), SYNTHETIC ABSORBABLE SUTURE: Brand: COATED VICRYL

## (undated) DEVICE — CVR C/ARM MINI

## (undated) DEVICE — DRSNG GZ CURAD XEROFORM NONADHS 5X9IN STRL

## (undated) DEVICE — SUT ETHLN 4/0 FS2 18IN 662H

## (undated) DEVICE — BLD BEAVR MINI RND/TIP GRN

## (undated) DEVICE — UNDRPD BREATH 23X36 BG/10

## (undated) DEVICE — SUT ETHLN 4/0 FS2 18IN 662G

## (undated) DEVICE — STERILE POLYISOPRENE POWDER-FREE SURGICAL GLOVES: Brand: PROTEXIS